# Patient Record
Sex: FEMALE | Race: BLACK OR AFRICAN AMERICAN | NOT HISPANIC OR LATINO | Employment: OTHER | URBAN - METROPOLITAN AREA
[De-identification: names, ages, dates, MRNs, and addresses within clinical notes are randomized per-mention and may not be internally consistent; named-entity substitution may affect disease eponyms.]

---

## 2019-08-03 ENCOUNTER — HOSPITAL ENCOUNTER (EMERGENCY)
Facility: HOSPITAL | Age: 64
Discharge: HOME | End: 2019-08-03
Attending: EMERGENCY MEDICINE
Payer: COMMERCIAL

## 2019-08-03 VITALS
HEIGHT: 60 IN | RESPIRATION RATE: 18 BRPM | SYSTOLIC BLOOD PRESSURE: 132 MMHG | HEART RATE: 89 BPM | BODY MASS INDEX: 31.41 KG/M2 | DIASTOLIC BLOOD PRESSURE: 71 MMHG | OXYGEN SATURATION: 98 % | TEMPERATURE: 98 F | WEIGHT: 160 LBS

## 2019-08-03 DIAGNOSIS — S61.210A LACERATION OF RIGHT INDEX FINGER WITHOUT FOREIGN BODY WITHOUT DAMAGE TO NAIL, INITIAL ENCOUNTER: Primary | ICD-10-CM

## 2019-08-03 PROCEDURE — 99284 EMERGENCY DEPT VISIT MOD MDM: CPT | Mod: 25

## 2019-08-03 PROCEDURE — 12001 RPR S/N/AX/GEN/TRNK 2.5CM/<: CPT

## 2019-08-03 PROCEDURE — 25000003 PHARM REV CODE 250: Performed by: PHYSICIAN ASSISTANT

## 2019-08-03 RX ORDER — ASPIRIN 81 MG/1
81 TABLET ORAL DAILY
COMMUNITY

## 2019-08-03 RX ORDER — LIDOCAINE HYDROCHLORIDE 10 MG/ML
10 INJECTION INFILTRATION; PERINEURAL
Status: COMPLETED | OUTPATIENT
Start: 2019-08-03 | End: 2019-08-03

## 2019-08-03 RX ORDER — CARVEDILOL 12.5 MG/1
12.5 TABLET ORAL 2 TIMES DAILY WITH MEALS
Status: ON HOLD | COMMUNITY
End: 2021-11-16 | Stop reason: HOSPADM

## 2019-08-03 RX ORDER — CEPHALEXIN 500 MG/1
500 CAPSULE ORAL 3 TIMES DAILY
Qty: 9 CAPSULE | Refills: 0 | Status: SHIPPED | OUTPATIENT
Start: 2019-08-03 | End: 2019-08-06

## 2019-08-03 RX ORDER — LISINOPRIL 10 MG/1
20 TABLET ORAL DAILY
COMMUNITY

## 2019-08-03 RX ORDER — BACITRACIN ZINC 500 UNIT/G
OINTMENT (GRAM) TOPICAL 2 TIMES DAILY
Qty: 14 G | Refills: 0 | Status: ON HOLD | OUTPATIENT
Start: 2019-08-03 | End: 2021-11-16 | Stop reason: HOSPADM

## 2019-08-03 RX ADMIN — NEOMYCIN AND POLYMYXIN B SULFATES AND BACITRACIN ZINC: 400; 3.5; 5 OINTMENT TOPICAL at 06:08

## 2019-08-03 RX ADMIN — LIDOCAINE HYDROCHLORIDE 10 ML: 10 INJECTION, SOLUTION INFILTRATION; PERINEURAL at 06:08

## 2019-08-03 NOTE — ED PROVIDER NOTES
Encounter Date: 8/3/2019       History     Chief Complaint   Patient presents with    Laceration     pt reports cutting right index finger on oven 30 min PTA. reports tetanus shot is up to date.      62yo F with pmh HTN with chief complaint R hand 2nd digit laceration x 1 hour pta. Pt cut hand while cleaning oven. Immediately applied pressure to wound. She is L handed. No other complaints. No uncontrolled bleeding. No suspicion for FB. Symptoms acute, constant, severity 5/10. Tetanus UTD per patient.        Review of patient's allergies indicates:  No Known Allergies  Past Medical History:   Diagnosis Date    Hypertension      History reviewed. No pertinent surgical history.  History reviewed. No pertinent family history.  Social History     Tobacco Use    Smoking status: Never Smoker    Smokeless tobacco: Never Used   Substance Use Topics    Alcohol use: Never     Frequency: Never    Drug use: Never     Review of Systems   Constitutional: Negative for chills and fever.   HENT: Negative for sore throat.    Eyes: Negative.  Negative for discharge and redness.   Respiratory: Negative for shortness of breath.    Cardiovascular: Negative for chest pain.   Gastrointestinal: Negative for abdominal pain, nausea and vomiting.   Endocrine: Negative.    Genitourinary: Negative for dysuria.   Musculoskeletal: Negative for back pain, neck pain and neck stiffness.   Skin: Positive for wound. Negative for rash.   Neurological: Negative for weakness and headaches.   Hematological: Does not bruise/bleed easily.   Psychiatric/Behavioral: Negative.    All other systems reviewed and are negative.      Physical Exam     Initial Vitals [08/03/19 1746]   BP Pulse Resp Temp SpO2   132/86 106 18 98.5 °F (36.9 °C) 98 %      MAP       --         Physical Exam    Nursing note and vitals reviewed.  Constitutional: She appears well-developed and well-nourished. She is not diaphoretic. No distress.   Well-appearing, nontoxic. Resting  comfortably on exam table.   HENT:   Head: Normocephalic and atraumatic.   Eyes: Conjunctivae and EOM are normal. Pupils are equal, round, and reactive to light.   Neck: Normal range of motion. Neck supple. No tracheal deviation present.   Cardiovascular: Intact distal pulses.   Pulmonary/Chest: No stridor. No respiratory distress.   Abdominal: There is no tenderness.   Musculoskeletal: She exhibits no tenderness.   R hand 2nd digit ulnar aspect proximal phalanx with 1cm laceration, entering dermis. No vascular injury or uncontrolled bleeding. No bony deformity. Cap refill wnl all digits.   Lymphadenopathy:     She has no cervical adenopathy.   Neurological: She is alert and oriented to person, place, and time. GCS score is 15. GCS eye subscore is 4. GCS verbal subscore is 5. GCS motor subscore is 6.   Skin: Skin is warm and dry. Capillary refill takes less than 2 seconds.   Psychiatric: She has a normal mood and affect. Her behavior is normal. Judgment and thought content normal.         ED Course   Lac Repair  Date/Time: 8/3/2019 7:36 PM  Performed by: Shelton Nelson PA-C  Authorized by: Sushil Ware MD   Body area: upper extremity  Location details: right index finger  Laceration length: 1 cm  Foreign bodies: no foreign bodies  Tendon involvement: none  Nerve involvement: none  Vascular damage: no  Anesthesia: digital block    Anesthesia:  Local Anesthetic: lidocaine 1% without epinephrine  Anesthetic total: 2 mL  Patient sedated: no  Preparation: Patient was prepped and draped in the usual sterile fashion.  Irrigation solution: saline  Irrigation method: syringe  Amount of cleaning: standard  Debridement: minimal  Degree of undermining: none  Skin closure: 4-0 nylon  Number of sutures: 2  Technique: simple  Approximation: close  Approximation difficulty: simple  Dressing: antibiotic ointment and splint for protection  Patient tolerance: Patient tolerated the procedure well with no immediate  complications        Labs Reviewed - No data to display       Imaging Results    None          Medical Decision Making:   Differential Diagnosis:   Open fracture, laceration, infected wound  ED Management:  No suspicion for open fracture.  Laceration repaired without issue.  Remains neurovascularly intact. PCP follow-up.  Return precautions given.                      Clinical Impression:       ICD-10-CM ICD-9-CM   1. Laceration of right index finger without foreign body without damage to nail, initial encounter S61.210A 883.0         Disposition:   Disposition: Discharged  Condition: Stable                        Shelton Nelson PA-C  08/03/19 1937

## 2019-08-03 NOTE — ED TRIAGE NOTES
"Pt reports "I was cleaning my son's oven and the hard edge rubber portion cut my finger."  Pt present with laceration to right hand 2nd digit.  Pt say it happen about 45 minutes ago and she preceded immediately to ER.    "

## 2019-08-04 NOTE — DISCHARGE INSTRUCTIONS
Keep dressing in place for 24 hr, do not get wet.  After that, change dressings twice daily; apply antibiotic ointment twice daily with each dressing change.  Keep finger splint in place when you are out and about.  Sutures need to be removed in 10-12 days.  Follow-up with your primary care provider next week for suture removal, wound re-evaluation.  Please return to this ED if wound becomes red and warm, if you experience increasing pain, if wound begins to drain foul-smelling fluid, if you begin with fever, if any other problems occur.

## 2021-11-14 ENCOUNTER — HOSPITAL ENCOUNTER (INPATIENT)
Facility: HOSPITAL | Age: 66
LOS: 2 days | Discharge: HOME | DRG: 286 | End: 2021-11-16
Attending: EMERGENCY MEDICINE | Admitting: STUDENT IN AN ORGANIZED HEALTH CARE EDUCATION/TRAINING PROGRAM
Payer: MEDICARE

## 2021-11-14 DIAGNOSIS — I25.10 CAD (CORONARY ARTERY DISEASE): ICD-10-CM

## 2021-11-14 DIAGNOSIS — R00.0 SINUS TACHYCARDIA: ICD-10-CM

## 2021-11-14 DIAGNOSIS — I50.9 ACUTE ON CHRONIC CONGESTIVE HEART FAILURE, UNSPECIFIED HEART FAILURE TYPE: ICD-10-CM

## 2021-11-14 DIAGNOSIS — I10 HYPERTENSION, UNSPECIFIED TYPE: ICD-10-CM

## 2021-11-14 DIAGNOSIS — R06.02 SOB (SHORTNESS OF BREATH): ICD-10-CM

## 2021-11-14 DIAGNOSIS — I50.9 CHF (CONGESTIVE HEART FAILURE): ICD-10-CM

## 2021-11-14 DIAGNOSIS — I50.23 ACUTE ON CHRONIC SYSTOLIC (CONGESTIVE) HEART FAILURE: ICD-10-CM

## 2021-11-14 DIAGNOSIS — J81.0 ACUTE PULMONARY EDEMA: Primary | ICD-10-CM

## 2021-11-14 DIAGNOSIS — J90 PLEURAL EFFUSION, BILATERAL: ICD-10-CM

## 2021-11-14 PROBLEM — J81.1 PULMONARY EDEMA: Status: ACTIVE | Noted: 2021-11-14

## 2021-11-14 LAB
ALBUMIN SERPL BCP-MCNC: 3.5 G/DL (ref 3.5–5.2)
ALP SERPL-CCNC: 76 U/L (ref 55–135)
ALT SERPL W/O P-5'-P-CCNC: 28 U/L (ref 10–44)
ANION GAP SERPL CALC-SCNC: 13 MMOL/L (ref 8–16)
AST SERPL-CCNC: 21 U/L (ref 10–40)
BASOPHILS # BLD AUTO: 0.03 K/UL (ref 0–0.2)
BASOPHILS NFR BLD: 0.4 % (ref 0–1.9)
BILIRUB SERPL-MCNC: 1.5 MG/DL (ref 0.1–1)
BILIRUB UR QL STRIP: NEGATIVE
BNP SERPL-MCNC: 899 PG/ML (ref 0–99)
BUN SERPL-MCNC: 18 MG/DL (ref 8–23)
CALCIUM SERPL-MCNC: 9.9 MG/DL (ref 8.7–10.5)
CHLORIDE SERPL-SCNC: 106 MMOL/L (ref 95–110)
CLARITY UR: CLEAR
CO2 SERPL-SCNC: 21 MMOL/L (ref 23–29)
COLOR UR: COLORLESS
CREAT SERPL-MCNC: 1.2 MG/DL (ref 0.5–1.4)
CTP QC/QA: YES
D DIMER PPP IA.FEU-MCNC: 2.92 MG/L FEU
DIFFERENTIAL METHOD: ABNORMAL
EOSINOPHIL # BLD AUTO: 0.1 K/UL (ref 0–0.5)
EOSINOPHIL NFR BLD: 1.2 % (ref 0–8)
ERYTHROCYTE [DISTWIDTH] IN BLOOD BY AUTOMATED COUNT: 13.6 % (ref 11.5–14.5)
EST. GFR  (AFRICAN AMERICAN): 54 ML/MIN/1.73 M^2
EST. GFR  (NON AFRICAN AMERICAN): 47 ML/MIN/1.73 M^2
GLUCOSE SERPL-MCNC: 79 MG/DL (ref 70–110)
GLUCOSE UR QL STRIP: NEGATIVE
HCT VFR BLD AUTO: 37.9 % (ref 37–48.5)
HGB BLD-MCNC: 12.2 G/DL (ref 12–16)
HGB UR QL STRIP: NEGATIVE
IMM GRANULOCYTES # BLD AUTO: 0.02 K/UL (ref 0–0.04)
IMM GRANULOCYTES NFR BLD AUTO: 0.3 % (ref 0–0.5)
INR PPP: 1.1 (ref 0.8–1.2)
KETONES UR QL STRIP: NEGATIVE
LEUKOCYTE ESTERASE UR QL STRIP: NEGATIVE
LYMPHOCYTES # BLD AUTO: 1.2 K/UL (ref 1–4.8)
LYMPHOCYTES NFR BLD: 17.6 % (ref 18–48)
MAGNESIUM SERPL-MCNC: 1.7 MG/DL (ref 1.6–2.6)
MCH RBC QN AUTO: 27 PG (ref 27–31)
MCHC RBC AUTO-ENTMCNC: 32.2 G/DL (ref 32–36)
MCV RBC AUTO: 84 FL (ref 82–98)
MONOCYTES # BLD AUTO: 0.3 K/UL (ref 0.3–1)
MONOCYTES NFR BLD: 4.8 % (ref 4–15)
NEUTROPHILS # BLD AUTO: 5.1 K/UL (ref 1.8–7.7)
NEUTROPHILS NFR BLD: 75.7 % (ref 38–73)
NITRITE UR QL STRIP: NEGATIVE
NRBC BLD-RTO: 0 /100 WBC
PH UR STRIP: 7 [PH] (ref 5–8)
PLATELET # BLD AUTO: 256 K/UL (ref 150–450)
PMV BLD AUTO: 11.7 FL (ref 9.2–12.9)
POTASSIUM SERPL-SCNC: 4 MMOL/L (ref 3.5–5.1)
PROT SERPL-MCNC: 7.3 G/DL (ref 6–8.4)
PROT UR QL STRIP: NEGATIVE
PROTHROMBIN TIME: 11.4 SEC (ref 9–12.5)
RBC # BLD AUTO: 4.52 M/UL (ref 4–5.4)
SARS-COV-2 RDRP RESP QL NAA+PROBE: NEGATIVE
SODIUM SERPL-SCNC: 140 MMOL/L (ref 136–145)
SP GR UR STRIP: 1.01 (ref 1–1.03)
TROPONIN I SERPL DL<=0.01 NG/ML-MCNC: 0.01 NG/ML (ref 0–0.03)
URN SPEC COLLECT METH UR: ABNORMAL
UROBILINOGEN UR STRIP-ACNC: NEGATIVE EU/DL
WBC # BLD AUTO: 6.7 K/UL (ref 3.9–12.7)

## 2021-11-14 PROCEDURE — 93010 EKG 12-LEAD: ICD-10-PCS | Mod: ,,, | Performed by: INTERNAL MEDICINE

## 2021-11-14 PROCEDURE — U0002 COVID-19 LAB TEST NON-CDC: HCPCS | Performed by: EMERGENCY MEDICINE

## 2021-11-14 PROCEDURE — 85610 PROTHROMBIN TIME: CPT | Performed by: EMERGENCY MEDICINE

## 2021-11-14 PROCEDURE — 81003 URINALYSIS AUTO W/O SCOPE: CPT | Performed by: PHYSICIAN ASSISTANT

## 2021-11-14 PROCEDURE — 93005 ELECTROCARDIOGRAM TRACING: CPT

## 2021-11-14 PROCEDURE — 93010 ELECTROCARDIOGRAM REPORT: CPT | Mod: ,,, | Performed by: INTERNAL MEDICINE

## 2021-11-14 PROCEDURE — 25500020 PHARM REV CODE 255: Performed by: EMERGENCY MEDICINE

## 2021-11-14 PROCEDURE — 84484 ASSAY OF TROPONIN QUANT: CPT | Performed by: EMERGENCY MEDICINE

## 2021-11-14 PROCEDURE — 83036 HEMOGLOBIN GLYCOSYLATED A1C: CPT | Performed by: EMERGENCY MEDICINE

## 2021-11-14 PROCEDURE — 80053 COMPREHEN METABOLIC PANEL: CPT | Performed by: EMERGENCY MEDICINE

## 2021-11-14 PROCEDURE — 99285 EMERGENCY DEPT VISIT HI MDM: CPT | Mod: 25

## 2021-11-14 PROCEDURE — 63600175 PHARM REV CODE 636 W HCPCS: Performed by: PHYSICIAN ASSISTANT

## 2021-11-14 PROCEDURE — 85379 FIBRIN DEGRADATION QUANT: CPT | Performed by: EMERGENCY MEDICINE

## 2021-11-14 PROCEDURE — 63600175 PHARM REV CODE 636 W HCPCS: Performed by: EMERGENCY MEDICINE

## 2021-11-14 PROCEDURE — 83880 ASSAY OF NATRIURETIC PEPTIDE: CPT | Performed by: EMERGENCY MEDICINE

## 2021-11-14 PROCEDURE — 83735 ASSAY OF MAGNESIUM: CPT | Performed by: EMERGENCY MEDICINE

## 2021-11-14 PROCEDURE — 11000001 HC ACUTE MED/SURG PRIVATE ROOM

## 2021-11-14 PROCEDURE — 25000003 PHARM REV CODE 250: Performed by: PHYSICIAN ASSISTANT

## 2021-11-14 PROCEDURE — 85025 COMPLETE CBC W/AUTO DIFF WBC: CPT | Performed by: EMERGENCY MEDICINE

## 2021-11-14 RX ORDER — AMOXICILLIN 250 MG
1 CAPSULE ORAL DAILY PRN
Status: DISCONTINUED | OUTPATIENT
Start: 2021-11-14 | End: 2021-11-16 | Stop reason: HOSPADM

## 2021-11-14 RX ORDER — FUROSEMIDE 10 MG/ML
40 INJECTION INTRAMUSCULAR; INTRAVENOUS
Status: DISCONTINUED | OUTPATIENT
Start: 2021-11-14 | End: 2021-11-14

## 2021-11-14 RX ORDER — ASPIRIN 81 MG/1
81 TABLET ORAL DAILY
Status: DISCONTINUED | OUTPATIENT
Start: 2021-11-15 | End: 2021-11-16 | Stop reason: HOSPADM

## 2021-11-14 RX ORDER — CARVEDILOL 12.5 MG/1
12.5 TABLET ORAL 2 TIMES DAILY WITH MEALS
Status: DISCONTINUED | OUTPATIENT
Start: 2021-11-14 | End: 2021-11-15

## 2021-11-14 RX ORDER — SODIUM CHLORIDE 0.9 % (FLUSH) 0.9 %
10 SYRINGE (ML) INJECTION
Status: DISCONTINUED | OUTPATIENT
Start: 2021-11-14 | End: 2021-11-16 | Stop reason: HOSPADM

## 2021-11-14 RX ORDER — ACETAMINOPHEN 500 MG
500 TABLET ORAL EVERY 6 HOURS PRN
Status: DISCONTINUED | OUTPATIENT
Start: 2021-11-14 | End: 2021-11-16

## 2021-11-14 RX ORDER — TALC
6 POWDER (GRAM) TOPICAL NIGHTLY PRN
Status: DISCONTINUED | OUTPATIENT
Start: 2021-11-14 | End: 2021-11-16 | Stop reason: HOSPADM

## 2021-11-14 RX ORDER — FUROSEMIDE 10 MG/ML
80 INJECTION INTRAMUSCULAR; INTRAVENOUS
Status: COMPLETED | OUTPATIENT
Start: 2021-11-14 | End: 2021-11-14

## 2021-11-14 RX ORDER — FUROSEMIDE 10 MG/ML
40 INJECTION INTRAMUSCULAR; INTRAVENOUS
Status: DISCONTINUED | OUTPATIENT
Start: 2021-11-14 | End: 2021-11-15

## 2021-11-14 RX ADMIN — FUROSEMIDE 40 MG: 10 INJECTION, SOLUTION INTRAMUSCULAR; INTRAVENOUS at 08:11

## 2021-11-14 RX ADMIN — CARVEDILOL 12.5 MG: 12.5 TABLET, FILM COATED ORAL at 04:11

## 2021-11-14 RX ADMIN — FUROSEMIDE 80 MG: 10 INJECTION, SOLUTION INTRAMUSCULAR; INTRAVENOUS at 03:11

## 2021-11-14 RX ADMIN — IOHEXOL 75 ML: 350 INJECTION, SOLUTION INTRAVENOUS at 02:11

## 2021-11-15 LAB
ANION GAP SERPL CALC-SCNC: 14 MMOL/L (ref 8–16)
ANION GAP SERPL CALC-SCNC: 15 MMOL/L (ref 8–16)
AORTIC ROOT ANNULUS: 2.6 CM
AORTIC VALVE CUSP SEPERATION: 1.63 CM
ASCENDING AORTA: 2.45 CM
AV INDEX (PROSTH): 0.51
AV MEAN GRADIENT: 4 MMHG
AV PEAK GRADIENT: 6 MMHG
AV VALVE AREA: 1.42 CM2
AV VELOCITY RATIO: 0.56
BASOPHILS # BLD AUTO: 0.03 K/UL (ref 0–0.2)
BASOPHILS NFR BLD: 0.3 % (ref 0–1.9)
BSA FOR ECHO PROCEDURE: 1.81 M2
BUN SERPL-MCNC: 23 MG/DL (ref 8–23)
BUN SERPL-MCNC: 23 MG/DL (ref 8–23)
CALCIUM SERPL-MCNC: 10.2 MG/DL (ref 8.7–10.5)
CALCIUM SERPL-MCNC: 9.9 MG/DL (ref 8.7–10.5)
CHLORIDE SERPL-SCNC: 100 MMOL/L (ref 95–110)
CHLORIDE SERPL-SCNC: 100 MMOL/L (ref 95–110)
CHOLEST SERPL-MCNC: 156 MG/DL (ref 120–199)
CHOLEST/HDLC SERPL: 4.2 {RATIO} (ref 2–5)
CO2 SERPL-SCNC: 25 MMOL/L (ref 23–29)
CO2 SERPL-SCNC: 26 MMOL/L (ref 23–29)
CREAT SERPL-MCNC: 1.3 MG/DL (ref 0.5–1.4)
CREAT SERPL-MCNC: 1.4 MG/DL (ref 0.5–1.4)
CV ECHO LV RWT: 0.2 CM
DIFFERENTIAL METHOD: ABNORMAL
DOP CALC AO PEAK VEL: 1.24 M/S
DOP CALC AO VTI: 20.55 CM
DOP CALC LVOT AREA: 2.8 CM2
DOP CALC LVOT DIAMETER: 1.88 CM
DOP CALC LVOT PEAK VEL: 0.69 M/S
DOP CALC LVOT STROKE VOLUME: 29.19 CM3
DOP CALCLVOT PEAK VEL VTI: 10.52 CM
E WAVE DECELERATION TIME: 127.91 MSEC
E/A RATIO: 0.85
ECHO LV POSTERIOR WALL: 0.7 CM (ref 0.6–1.1)
EJECTION FRACTION: 10 %
EOSINOPHIL # BLD AUTO: 0 K/UL (ref 0–0.5)
EOSINOPHIL NFR BLD: 0.2 % (ref 0–8)
ERYTHROCYTE [DISTWIDTH] IN BLOOD BY AUTOMATED COUNT: 13.8 % (ref 11.5–14.5)
EST. GFR  (AFRICAN AMERICAN): 45 ML/MIN/1.73 M^2
EST. GFR  (AFRICAN AMERICAN): 49 ML/MIN/1.73 M^2
EST. GFR  (NON AFRICAN AMERICAN): 39 ML/MIN/1.73 M^2
EST. GFR  (NON AFRICAN AMERICAN): 43 ML/MIN/1.73 M^2
ESTIMATED AVG GLUCOSE: 111 MG/DL (ref 68–131)
FRACTIONAL SHORTENING: 5 % (ref 28–44)
GLUCOSE SERPL-MCNC: 108 MG/DL (ref 70–110)
GLUCOSE SERPL-MCNC: 91 MG/DL (ref 70–110)
HBA1C MFR BLD: 5.5 % (ref 4–5.6)
HCT VFR BLD AUTO: 39.7 % (ref 37–48.5)
HDLC SERPL-MCNC: 37 MG/DL (ref 40–75)
HDLC SERPL: 23.7 % (ref 20–50)
HGB BLD-MCNC: 13 G/DL (ref 12–16)
IMM GRANULOCYTES # BLD AUTO: 0.02 K/UL (ref 0–0.04)
IMM GRANULOCYTES NFR BLD AUTO: 0.2 % (ref 0–0.5)
INR PPP: 1.1 (ref 0.8–1.2)
INTERVENTRICULAR SEPTUM: 0.68 CM (ref 0.6–1.1)
IVRT: 114.19 MSEC
LA MAJOR: 5.28 CM
LA MINOR: 5.05 CM
LA WIDTH: 4.95 CM
LDLC SERPL CALC-MCNC: 97.4 MG/DL (ref 63–159)
LEFT ATRIUM SIZE: 4.1 CM
LEFT ATRIUM VOLUME INDEX: 49.2 ML/M2
LEFT ATRIUM VOLUME: 89.06 CM3
LEFT INTERNAL DIMENSION IN SYSTOLE: 6.7 CM (ref 2.1–4)
LEFT VENTRICLE DIASTOLIC VOLUME INDEX: 142.32 ML/M2
LEFT VENTRICLE DIASTOLIC VOLUME: 257.6 ML
LEFT VENTRICLE MASS INDEX: 114 G/M2
LEFT VENTRICLE SYSTOLIC VOLUME INDEX: 127.7 ML/M2
LEFT VENTRICLE SYSTOLIC VOLUME: 231.09 ML
LEFT VENTRICULAR INTERNAL DIMENSION IN DIASTOLE: 7.03 CM (ref 3.5–6)
LEFT VENTRICULAR MASS: 206.43 G
LV SEPTAL E/E' RATIO: 39.5 M/S
LYMPHOCYTES # BLD AUTO: 1 K/UL (ref 1–4.8)
LYMPHOCYTES NFR BLD: 10.8 % (ref 18–48)
MAGNESIUM SERPL-MCNC: 1.9 MG/DL (ref 1.6–2.6)
MCH RBC QN AUTO: 27.4 PG (ref 27–31)
MCHC RBC AUTO-ENTMCNC: 32.7 G/DL (ref 32–36)
MCV RBC AUTO: 84 FL (ref 82–98)
MONOCYTES # BLD AUTO: 0.5 K/UL (ref 0.3–1)
MONOCYTES NFR BLD: 5.8 % (ref 4–15)
MV PEAK A VEL: 0.93 M/S
MV PEAK E VEL: 0.79 M/S
NEUTROPHILS # BLD AUTO: 7.6 K/UL (ref 1.8–7.7)
NEUTROPHILS NFR BLD: 82.7 % (ref 38–73)
NONHDLC SERPL-MCNC: 119 MG/DL
NRBC BLD-RTO: 0 /100 WBC
PISA TR MAX VEL: 2.39 M/S
PLATELET # BLD AUTO: 296 K/UL (ref 150–450)
PMV BLD AUTO: 11.7 FL (ref 9.2–12.9)
POTASSIUM SERPL-SCNC: 4.1 MMOL/L (ref 3.5–5.1)
POTASSIUM SERPL-SCNC: 4.1 MMOL/L (ref 3.5–5.1)
PROTHROMBIN TIME: 11.7 SEC (ref 9–12.5)
PV PEAK VELOCITY: 0.88 CM/S
RA MAJOR: 4.55 CM
RA PRESSURE: 3 MMHG
RA WIDTH: 3.95 CM
RBC # BLD AUTO: 4.74 M/UL (ref 4–5.4)
RIGHT VENTRICULAR END-DIASTOLIC DIMENSION: 3.32 CM
RV TISSUE DOPPLER FREE WALL SYSTOLIC VELOCITY 1 (APICAL 4 CHAMBER VIEW): 7.38 CM/S
SINUS: 2.76 CM
SODIUM SERPL-SCNC: 140 MMOL/L (ref 136–145)
SODIUM SERPL-SCNC: 140 MMOL/L (ref 136–145)
STJ: 2.16 CM
TDI SEPTAL: 0.02 M/S
TR MAX PG: 23 MMHG
TRICUSPID ANNULAR PLANE SYSTOLIC EXCURSION: 2.01 CM
TRIGL SERPL-MCNC: 108 MG/DL (ref 30–150)
TSH SERPL DL<=0.005 MIU/L-ACNC: 1.45 UIU/ML (ref 0.4–4)
TV REST PULMONARY ARTERY PRESSURE: 26 MMHG
WBC # BLD AUTO: 9.25 K/UL (ref 3.9–12.7)

## 2021-11-15 PROCEDURE — 80048 BASIC METABOLIC PNL TOTAL CA: CPT | Mod: 91 | Performed by: PHYSICIAN ASSISTANT

## 2021-11-15 PROCEDURE — 25000003 PHARM REV CODE 250: Performed by: INTERNAL MEDICINE

## 2021-11-15 PROCEDURE — 85610 PROTHROMBIN TIME: CPT | Performed by: PHYSICIAN ASSISTANT

## 2021-11-15 PROCEDURE — 25000003 PHARM REV CODE 250: Performed by: PHYSICIAN ASSISTANT

## 2021-11-15 PROCEDURE — 36415 COLL VENOUS BLD VENIPUNCTURE: CPT | Performed by: STUDENT IN AN ORGANIZED HEALTH CARE EDUCATION/TRAINING PROGRAM

## 2021-11-15 PROCEDURE — 11000001 HC ACUTE MED/SURG PRIVATE ROOM

## 2021-11-15 PROCEDURE — 83735 ASSAY OF MAGNESIUM: CPT | Performed by: STUDENT IN AN ORGANIZED HEALTH CARE EDUCATION/TRAINING PROGRAM

## 2021-11-15 PROCEDURE — 36415 COLL VENOUS BLD VENIPUNCTURE: CPT | Performed by: PHYSICIAN ASSISTANT

## 2021-11-15 PROCEDURE — 99223 1ST HOSP IP/OBS HIGH 75: CPT | Mod: ,,, | Performed by: INTERNAL MEDICINE

## 2021-11-15 PROCEDURE — 25000003 PHARM REV CODE 250: Performed by: STUDENT IN AN ORGANIZED HEALTH CARE EDUCATION/TRAINING PROGRAM

## 2021-11-15 PROCEDURE — 63600175 PHARM REV CODE 636 W HCPCS: Performed by: INTERNAL MEDICINE

## 2021-11-15 PROCEDURE — 85025 COMPLETE CBC W/AUTO DIFF WBC: CPT | Performed by: PHYSICIAN ASSISTANT

## 2021-11-15 PROCEDURE — 80048 BASIC METABOLIC PNL TOTAL CA: CPT | Performed by: STUDENT IN AN ORGANIZED HEALTH CARE EDUCATION/TRAINING PROGRAM

## 2021-11-15 PROCEDURE — 99223 PR INITIAL HOSPITAL CARE,LEVL III: ICD-10-PCS | Mod: ,,, | Performed by: INTERNAL MEDICINE

## 2021-11-15 PROCEDURE — 80061 LIPID PANEL: CPT | Performed by: PHYSICIAN ASSISTANT

## 2021-11-15 PROCEDURE — 84443 ASSAY THYROID STIM HORMONE: CPT | Performed by: PHYSICIAN ASSISTANT

## 2021-11-15 RX ORDER — MAGNESIUM SULFATE 1 G/100ML
1 INJECTION INTRAVENOUS ONCE
Status: COMPLETED | OUTPATIENT
Start: 2021-11-15 | End: 2021-11-15

## 2021-11-15 RX ORDER — CLOPIDOGREL BISULFATE 75 MG/1
75 TABLET ORAL DAILY
Status: DISCONTINUED | OUTPATIENT
Start: 2021-11-16 | End: 2021-11-16 | Stop reason: HOSPADM

## 2021-11-15 RX ORDER — CLOPIDOGREL BISULFATE 75 MG/1
300 TABLET ORAL ONCE
Status: COMPLETED | OUTPATIENT
Start: 2021-11-15 | End: 2021-11-15

## 2021-11-15 RX ADMIN — METOPROLOL SUCCINATE 12.5 MG: 25 TABLET, EXTENDED RELEASE ORAL at 12:11

## 2021-11-15 RX ADMIN — MAGNESIUM SULFATE 1 G: 1 INJECTION INTRAVENOUS at 04:11

## 2021-11-15 RX ADMIN — ASPIRIN 81 MG: 81 TABLET, COATED ORAL at 10:11

## 2021-11-15 RX ADMIN — CLOPIDOGREL 300 MG: 75 TABLET, FILM COATED ORAL at 10:11

## 2021-11-16 VITALS
TEMPERATURE: 98 F | WEIGHT: 187.19 LBS | HEART RATE: 96 BPM | OXYGEN SATURATION: 97 % | BODY MASS INDEX: 36.75 KG/M2 | DIASTOLIC BLOOD PRESSURE: 73 MMHG | SYSTOLIC BLOOD PRESSURE: 107 MMHG | RESPIRATION RATE: 18 BRPM | HEIGHT: 60 IN

## 2021-11-16 LAB
ANION GAP SERPL CALC-SCNC: 11 MMOL/L (ref 8–16)
BASOPHILS # BLD AUTO: 0.02 K/UL (ref 0–0.2)
BASOPHILS NFR BLD: 0.2 % (ref 0–1.9)
BUN SERPL-MCNC: 25 MG/DL (ref 8–23)
CALCIUM SERPL-MCNC: 9.2 MG/DL (ref 8.7–10.5)
CHLORIDE SERPL-SCNC: 102 MMOL/L (ref 95–110)
CO2 SERPL-SCNC: 24 MMOL/L (ref 23–29)
CREAT SERPL-MCNC: 1.2 MG/DL (ref 0.5–1.4)
DIFFERENTIAL METHOD: ABNORMAL
EOSINOPHIL # BLD AUTO: 0.2 K/UL (ref 0–0.5)
EOSINOPHIL NFR BLD: 1.6 % (ref 0–8)
ERYTHROCYTE [DISTWIDTH] IN BLOOD BY AUTOMATED COUNT: 14.1 % (ref 11.5–14.5)
EST. GFR  (AFRICAN AMERICAN): 54 ML/MIN/1.73 M^2
EST. GFR  (NON AFRICAN AMERICAN): 47 ML/MIN/1.73 M^2
GLUCOSE SERPL-MCNC: 97 MG/DL (ref 70–110)
HCT VFR BLD AUTO: 41.7 % (ref 37–48.5)
HGB BLD-MCNC: 13.2 G/DL (ref 12–16)
IMM GRANULOCYTES # BLD AUTO: 0.02 K/UL (ref 0–0.04)
IMM GRANULOCYTES NFR BLD AUTO: 0.2 % (ref 0–0.5)
LYMPHOCYTES # BLD AUTO: 1.4 K/UL (ref 1–4.8)
LYMPHOCYTES NFR BLD: 13.9 % (ref 18–48)
MAGNESIUM SERPL-MCNC: 2.1 MG/DL (ref 1.6–2.6)
MCH RBC QN AUTO: 27.2 PG (ref 27–31)
MCHC RBC AUTO-ENTMCNC: 31.7 G/DL (ref 32–36)
MCV RBC AUTO: 86 FL (ref 82–98)
MONOCYTES # BLD AUTO: 0.7 K/UL (ref 0.3–1)
MONOCYTES NFR BLD: 7.2 % (ref 4–15)
NEUTROPHILS # BLD AUTO: 7.5 K/UL (ref 1.8–7.7)
NEUTROPHILS NFR BLD: 76.9 % (ref 38–73)
NRBC BLD-RTO: 0 /100 WBC
PLATELET # BLD AUTO: 280 K/UL (ref 150–450)
PMV BLD AUTO: 12 FL (ref 9.2–12.9)
POTASSIUM SERPL-SCNC: 3.3 MMOL/L (ref 3.5–5.1)
RBC # BLD AUTO: 4.86 M/UL (ref 4–5.4)
SODIUM SERPL-SCNC: 137 MMOL/L (ref 136–145)
WBC # BLD AUTO: 9.71 K/UL (ref 3.9–12.7)

## 2021-11-16 PROCEDURE — 80048 BASIC METABOLIC PNL TOTAL CA: CPT | Performed by: PHYSICIAN ASSISTANT

## 2021-11-16 PROCEDURE — 85025 COMPLETE CBC W/AUTO DIFF WBC: CPT | Performed by: INTERNAL MEDICINE

## 2021-11-16 PROCEDURE — 25500020 PHARM REV CODE 255: Performed by: INTERNAL MEDICINE

## 2021-11-16 PROCEDURE — 93458 L HRT ARTERY/VENTRICLE ANGIO: CPT | Mod: 26,,, | Performed by: INTERNAL MEDICINE

## 2021-11-16 PROCEDURE — C1769 GUIDE WIRE: HCPCS | Performed by: INTERNAL MEDICINE

## 2021-11-16 PROCEDURE — C1894 INTRO/SHEATH, NON-LASER: HCPCS | Performed by: INTERNAL MEDICINE

## 2021-11-16 PROCEDURE — 63600175 PHARM REV CODE 636 W HCPCS: Performed by: INTERNAL MEDICINE

## 2021-11-16 PROCEDURE — 25000003 PHARM REV CODE 250: Performed by: INTERNAL MEDICINE

## 2021-11-16 PROCEDURE — 99152 PR MOD CONSCIOUS SEDATION, SAME PHYS, 5+ YRS, FIRST 15 MIN: ICD-10-PCS | Mod: ,,, | Performed by: INTERNAL MEDICINE

## 2021-11-16 PROCEDURE — C1887 CATHETER, GUIDING: HCPCS | Performed by: INTERNAL MEDICINE

## 2021-11-16 PROCEDURE — 83735 ASSAY OF MAGNESIUM: CPT | Performed by: PHYSICIAN ASSISTANT

## 2021-11-16 PROCEDURE — 99152 MOD SED SAME PHYS/QHP 5/>YRS: CPT | Performed by: INTERNAL MEDICINE

## 2021-11-16 PROCEDURE — 93458 L HRT ARTERY/VENTRICLE ANGIO: CPT | Performed by: INTERNAL MEDICINE

## 2021-11-16 PROCEDURE — 36415 COLL VENOUS BLD VENIPUNCTURE: CPT | Performed by: INTERNAL MEDICINE

## 2021-11-16 PROCEDURE — 93458 PR CATH PLACE/CORON ANGIO, IMG SUPER/INTERP,W LEFT HEART VENTRICULOGRAPHY: ICD-10-PCS | Mod: 26,,, | Performed by: INTERNAL MEDICINE

## 2021-11-16 PROCEDURE — 36415 COLL VENOUS BLD VENIPUNCTURE: CPT | Performed by: PHYSICIAN ASSISTANT

## 2021-11-16 PROCEDURE — 99152 MOD SED SAME PHYS/QHP 5/>YRS: CPT | Mod: ,,, | Performed by: INTERNAL MEDICINE

## 2021-11-16 PROCEDURE — 99153 MOD SED SAME PHYS/QHP EA: CPT | Performed by: INTERNAL MEDICINE

## 2021-11-16 RX ORDER — ACETAMINOPHEN 325 MG/1
650 TABLET ORAL EVERY 4 HOURS PRN
Status: DISCONTINUED | OUTPATIENT
Start: 2021-11-16 | End: 2021-11-16 | Stop reason: HOSPADM

## 2021-11-16 RX ORDER — LIDOCAINE HYDROCHLORIDE 10 MG/ML
INJECTION, SOLUTION EPIDURAL; INFILTRATION; INTRACAUDAL; PERINEURAL
Status: DISCONTINUED | OUTPATIENT
Start: 2021-11-16 | End: 2021-11-16 | Stop reason: HOSPADM

## 2021-11-16 RX ORDER — VERAPAMIL HYDROCHLORIDE 2.5 MG/ML
INJECTION, SOLUTION INTRAVENOUS
Status: DISCONTINUED | OUTPATIENT
Start: 2021-11-16 | End: 2021-11-16 | Stop reason: HOSPADM

## 2021-11-16 RX ORDER — FUROSEMIDE 20 MG/1
20 TABLET ORAL DAILY
Qty: 90 TABLET | Refills: 0 | Status: SHIPPED | OUTPATIENT
Start: 2021-11-16 | End: 2022-02-14

## 2021-11-16 RX ORDER — NITROGLYCERIN 20 MG/100ML
INJECTION INTRAVENOUS
Status: DISCONTINUED | OUTPATIENT
Start: 2021-11-16 | End: 2021-11-16 | Stop reason: HOSPADM

## 2021-11-16 RX ORDER — OXYCODONE HYDROCHLORIDE 5 MG/1
5 TABLET ORAL EVERY 4 HOURS PRN
Status: DISCONTINUED | OUTPATIENT
Start: 2021-11-16 | End: 2021-11-16 | Stop reason: HOSPADM

## 2021-11-16 RX ORDER — DIPHENHYDRAMINE HCL 25 MG
50 CAPSULE ORAL ONCE
Status: DISCONTINUED | OUTPATIENT
Start: 2021-11-16 | End: 2021-11-16 | Stop reason: HOSPADM

## 2021-11-16 RX ORDER — POTASSIUM CHLORIDE 20 MEQ/1
40 TABLET, EXTENDED RELEASE ORAL ONCE
Status: DISCONTINUED | OUTPATIENT
Start: 2021-11-16 | End: 2021-11-16 | Stop reason: HOSPADM

## 2021-11-16 RX ORDER — IODIXANOL 320 MG/ML
INJECTION, SOLUTION INTRAVASCULAR
Status: DISCONTINUED | OUTPATIENT
Start: 2021-11-16 | End: 2021-11-16 | Stop reason: HOSPADM

## 2021-11-16 RX ORDER — MORPHINE SULFATE 4 MG/ML
3 INJECTION, SOLUTION INTRAMUSCULAR; INTRAVENOUS
Status: DISCONTINUED | OUTPATIENT
Start: 2021-11-16 | End: 2021-11-16 | Stop reason: HOSPADM

## 2021-11-16 RX ORDER — METOPROLOL SUCCINATE 25 MG/1
12.5 TABLET, EXTENDED RELEASE ORAL DAILY
Qty: 45 TABLET | Refills: 0 | Status: SHIPPED | OUTPATIENT
Start: 2021-11-17 | End: 2022-02-15

## 2021-11-16 RX ORDER — FENTANYL CITRATE 50 UG/ML
INJECTION, SOLUTION INTRAMUSCULAR; INTRAVENOUS
Status: DISCONTINUED | OUTPATIENT
Start: 2021-11-16 | End: 2021-11-16 | Stop reason: HOSPADM

## 2021-11-16 RX ORDER — SODIUM CHLORIDE 9 MG/ML
INJECTION, SOLUTION INTRAVENOUS CONTINUOUS
Status: DISCONTINUED | OUTPATIENT
Start: 2021-11-16 | End: 2021-11-16 | Stop reason: HOSPADM

## 2021-11-16 RX ORDER — MIDAZOLAM HYDROCHLORIDE 1 MG/ML
INJECTION, SOLUTION INTRAMUSCULAR; INTRAVENOUS
Status: DISCONTINUED | OUTPATIENT
Start: 2021-11-16 | End: 2021-11-16 | Stop reason: HOSPADM

## 2021-11-16 RX ORDER — ONDANSETRON 2 MG/ML
4 INJECTION INTRAMUSCULAR; INTRAVENOUS EVERY 12 HOURS PRN
Status: DISCONTINUED | OUTPATIENT
Start: 2021-11-16 | End: 2021-11-16 | Stop reason: HOSPADM

## 2022-01-09 PROCEDURE — 96376 TX/PRO/DX INJ SAME DRUG ADON: CPT

## 2022-01-09 PROCEDURE — 93005 ELECTROCARDIOGRAM TRACING: CPT

## 2022-01-09 PROCEDURE — 93010 EKG 12-LEAD: ICD-10-PCS | Mod: ,,, | Performed by: INTERNAL MEDICINE

## 2022-01-09 PROCEDURE — 96374 THER/PROPH/DIAG INJ IV PUSH: CPT

## 2022-01-09 PROCEDURE — 99285 EMERGENCY DEPT VISIT HI MDM: CPT | Mod: 25

## 2022-01-09 PROCEDURE — 93010 ELECTROCARDIOGRAM REPORT: CPT | Mod: ,,, | Performed by: INTERNAL MEDICINE

## 2022-01-09 PROCEDURE — 96372 THER/PROPH/DIAG INJ SC/IM: CPT | Mod: 59

## 2022-01-10 ENCOUNTER — HOSPITAL ENCOUNTER (OUTPATIENT)
Facility: HOSPITAL | Age: 67
Discharge: HOME | End: 2022-01-11
Attending: EMERGENCY MEDICINE | Admitting: STUDENT IN AN ORGANIZED HEALTH CARE EDUCATION/TRAINING PROGRAM
Payer: MEDICARE

## 2022-01-10 DIAGNOSIS — I50.9 CHF (CONGESTIVE HEART FAILURE): ICD-10-CM

## 2022-01-10 DIAGNOSIS — I50.9 ACUTE ON CHRONIC CONGESTIVE HEART FAILURE, UNSPECIFIED HEART FAILURE TYPE: Primary | ICD-10-CM

## 2022-01-10 DIAGNOSIS — I47.20 VENTRICULAR TACHYCARDIA: ICD-10-CM

## 2022-01-10 DIAGNOSIS — R06.02 SHORTNESS OF BREATH: ICD-10-CM

## 2022-01-10 DIAGNOSIS — J81.0 ACUTE PULMONARY EDEMA: ICD-10-CM

## 2022-01-10 DIAGNOSIS — R07.9 CHEST PAIN: ICD-10-CM

## 2022-01-10 PROBLEM — R19.7 DIARRHEA: Status: ACTIVE | Noted: 2022-01-10

## 2022-01-10 PROBLEM — R79.89 ELEVATED BRAIN NATRIURETIC PEPTIDE (BNP) LEVEL: Status: ACTIVE | Noted: 2022-01-10

## 2022-01-10 PROBLEM — E87.70 VOLUME OVERLOAD: Status: ACTIVE | Noted: 2022-01-10

## 2022-01-10 PROBLEM — I51.7 CARDIOMEGALY: Status: ACTIVE | Noted: 2022-01-10

## 2022-01-10 PROBLEM — I50.20 HFREF (HEART FAILURE WITH REDUCED EJECTION FRACTION): Status: ACTIVE | Noted: 2022-01-10

## 2022-01-10 LAB
ALBUMIN SERPL BCP-MCNC: 3.3 G/DL (ref 3.5–5.2)
ALP SERPL-CCNC: 113 U/L (ref 55–135)
ALT SERPL W/O P-5'-P-CCNC: 50 U/L (ref 10–44)
ANION GAP SERPL CALC-SCNC: 16 MMOL/L (ref 8–16)
AST SERPL-CCNC: 34 U/L (ref 10–40)
AV INDEX (PROSTH): 0.55
AV MEAN GRADIENT: 2 MMHG
AV PEAK GRADIENT: 3 MMHG
AV VALVE AREA: 0.96 CM2
AV VELOCITY RATIO: 0.58
BACTERIA #/AREA URNS HPF: ABNORMAL /HPF
BASOPHILS # BLD AUTO: 0.03 K/UL (ref 0–0.2)
BASOPHILS NFR BLD: 0.4 % (ref 0–1.9)
BILIRUB SERPL-MCNC: 1.9 MG/DL (ref 0.1–1)
BILIRUB UR QL STRIP: NEGATIVE
BNP SERPL-MCNC: 2309 PG/ML (ref 0–99)
BSA FOR ECHO PROCEDURE: 1.76 M2
BUN SERPL-MCNC: 29 MG/DL (ref 8–23)
CALCIUM SERPL-MCNC: 8.7 MG/DL (ref 8.7–10.5)
CHLORIDE SERPL-SCNC: 105 MMOL/L (ref 95–110)
CLARITY UR: CLEAR
CO2 SERPL-SCNC: 20 MMOL/L (ref 23–29)
COLOR UR: YELLOW
CREAT SERPL-MCNC: 1.4 MG/DL (ref 0.5–1.4)
CTP QC/QA: YES
CV ECHO LV RWT: 0.33 CM
D DIMER PPP IA.FEU-MCNC: 7.35 MG/L FEU
DIFFERENTIAL METHOD: ABNORMAL
DOP CALC AO PEAK VEL: 0.93 M/S
DOP CALC AO VTI: 12.25 CM
DOP CALC LVOT AREA: 1.7 CM2
DOP CALC LVOT DIAMETER: 1.49 CM
DOP CALC LVOT PEAK VEL: 0.54 M/S
DOP CALC LVOT STROKE VOLUME: 11.73 CM3
DOP CALCLVOT PEAK VEL VTI: 6.73 CM
E WAVE DECELERATION TIME: 132.98 MSEC
E/A RATIO: 2.16
E/E' RATIO: 21.2 M/S
ECHO LV POSTERIOR WALL: 1.05 CM (ref 0.6–1.1)
EJECTION FRACTION: 10 %
EOSINOPHIL # BLD AUTO: 0 K/UL (ref 0–0.5)
EOSINOPHIL NFR BLD: 0.4 % (ref 0–8)
ERYTHROCYTE [DISTWIDTH] IN BLOOD BY AUTOMATED COUNT: 15.8 % (ref 11.5–14.5)
EST. GFR  (AFRICAN AMERICAN): 45 ML/MIN/1.73 M^2
EST. GFR  (NON AFRICAN AMERICAN): 39 ML/MIN/1.73 M^2
FRACTIONAL SHORTENING: 5 % (ref 28–44)
GLUCOSE SERPL-MCNC: 103 MG/DL (ref 70–110)
GLUCOSE UR QL STRIP: NEGATIVE
HCT VFR BLD AUTO: 37.6 % (ref 37–48.5)
HGB BLD-MCNC: 11.7 G/DL (ref 12–16)
HGB UR QL STRIP: ABNORMAL
HYALINE CASTS #/AREA URNS LPF: 3 /LPF
IMM GRANULOCYTES # BLD AUTO: 0.03 K/UL (ref 0–0.04)
IMM GRANULOCYTES NFR BLD AUTO: 0.4 % (ref 0–0.5)
INTERVENTRICULAR SEPTUM: 0.59 CM (ref 0.6–1.1)
IVRT: 86.51 MSEC
KETONES UR QL STRIP: NEGATIVE
LA MAJOR: 6.99 CM
LA MINOR: 6.54 CM
LA WIDTH: 5.14 CM
LEFT ATRIUM SIZE: 4.85 CM
LEFT ATRIUM VOLUME INDEX: 83.7 ML/M2
LEFT ATRIUM VOLUME: 143.19 CM3
LEFT INTERNAL DIMENSION IN SYSTOLE: 6.11 CM (ref 2.1–4)
LEFT VENTRICLE DIASTOLIC VOLUME INDEX: 122.51 ML/M2
LEFT VENTRICLE DIASTOLIC VOLUME: 209.49 ML
LEFT VENTRICLE MASS INDEX: 126 G/M2
LEFT VENTRICLE SYSTOLIC VOLUME INDEX: 109.6 ML/M2
LEFT VENTRICLE SYSTOLIC VOLUME: 187.35 ML
LEFT VENTRICULAR INTERNAL DIMENSION IN DIASTOLE: 6.41 CM (ref 3.5–6)
LEFT VENTRICULAR MASS: 215.49 G
LEUKOCYTE ESTERASE UR QL STRIP: ABNORMAL
LV LATERAL E/E' RATIO: 13.25 M/S
LV SEPTAL E/E' RATIO: 53 M/S
LYMPHOCYTES # BLD AUTO: 1.1 K/UL (ref 1–4.8)
LYMPHOCYTES NFR BLD: 15.3 % (ref 18–48)
MAGNESIUM SERPL-MCNC: 1.9 MG/DL (ref 1.6–2.6)
MCH RBC QN AUTO: 26.8 PG (ref 27–31)
MCHC RBC AUTO-ENTMCNC: 31.1 G/DL (ref 32–36)
MCV RBC AUTO: 86 FL (ref 82–98)
MICROSCOPIC COMMENT: ABNORMAL
MONOCYTES # BLD AUTO: 0.4 K/UL (ref 0.3–1)
MONOCYTES NFR BLD: 5.9 % (ref 4–15)
MV PEAK A VEL: 0.49 M/S
MV PEAK E VEL: 1.06 M/S
MV STENOSIS PRESSURE HALF TIME: 38.56 MS
MV VALVE AREA P 1/2 METHOD: 5.71 CM2
NEUTROPHILS # BLD AUTO: 5.5 K/UL (ref 1.8–7.7)
NEUTROPHILS NFR BLD: 77.6 % (ref 38–73)
NITRITE UR QL STRIP: NEGATIVE
NRBC BLD-RTO: 0 /100 WBC
OB PNL STL: NEGATIVE
PH UR STRIP: 6 [PH] (ref 5–8)
PHOSPHATE SERPL-MCNC: 3.6 MG/DL (ref 2.7–4.5)
PISA TR MAX VEL: 3.59 M/S
PLATELET # BLD AUTO: 257 K/UL (ref 150–450)
PMV BLD AUTO: 10.8 FL (ref 9.2–12.9)
POTASSIUM SERPL-SCNC: 3.9 MMOL/L (ref 3.5–5.1)
PROT SERPL-MCNC: 6.9 G/DL (ref 6–8.4)
PROT UR QL STRIP: ABNORMAL
PV PEAK VELOCITY: 0.76 CM/S
RA MAJOR: 6.32 CM
RA PRESSURE: 15 MMHG
RA WIDTH: 6.57 CM
RBC # BLD AUTO: 4.37 M/UL (ref 4–5.4)
RBC #/AREA URNS HPF: 4 /HPF (ref 0–4)
RIGHT VENTRICULAR END-DIASTOLIC DIMENSION: 4.57 CM
RV AG STL QL IA.RAPID: NEGATIVE
SARS-COV-2 RDRP RESP QL NAA+PROBE: NEGATIVE
SINUS: 2.42 CM
SODIUM SERPL-SCNC: 141 MMOL/L (ref 136–145)
SP GR UR STRIP: >1.03 (ref 1–1.03)
SQUAMOUS #/AREA URNS HPF: 3 /HPF
STJ: 2.24 CM
TDI LATERAL: 0.08 M/S
TDI SEPTAL: 0.02 M/S
TDI: 0.05 M/S
TR MAX PG: 52 MMHG
TRICUSPID ANNULAR PLANE SYSTOLIC EXCURSION: 1.29 CM
TROPONIN I SERPL DL<=0.01 NG/ML-MCNC: <0.006 NG/ML (ref 0–0.03)
TSH SERPL DL<=0.005 MIU/L-ACNC: 3.72 UIU/ML (ref 0.4–4)
TV REST PULMONARY ARTERY PRESSURE: 67 MMHG
URN SPEC COLLECT METH UR: ABNORMAL
UROBILINOGEN UR STRIP-ACNC: NEGATIVE EU/DL
WBC # BLD AUTO: 7.07 K/UL (ref 3.9–12.7)
WBC #/AREA STL HPF: NORMAL /[HPF]
WBC #/AREA URNS HPF: 5 /HPF (ref 0–5)
WBC CLUMPS URNS QL MICRO: ABNORMAL

## 2022-01-10 PROCEDURE — 87425 ROTAVIRUS AG IA: CPT | Performed by: STUDENT IN AN ORGANIZED HEALTH CARE EDUCATION/TRAINING PROGRAM

## 2022-01-10 PROCEDURE — 25000003 PHARM REV CODE 250: Performed by: INTERNAL MEDICINE

## 2022-01-10 PROCEDURE — 83993 ASSAY FOR CALPROTECTIN FECAL: CPT | Performed by: STUDENT IN AN ORGANIZED HEALTH CARE EDUCATION/TRAINING PROGRAM

## 2022-01-10 PROCEDURE — 82705 FATS/LIPIDS FECES QUAL: CPT | Performed by: STUDENT IN AN ORGANIZED HEALTH CARE EDUCATION/TRAINING PROGRAM

## 2022-01-10 PROCEDURE — 87449 NOS EACH ORGANISM AG IA: CPT | Performed by: STUDENT IN AN ORGANIZED HEALTH CARE EDUCATION/TRAINING PROGRAM

## 2022-01-10 PROCEDURE — 99220 PR INITIAL OBSERVATION CARE,LEVL III: CPT | Mod: 25,,, | Performed by: INTERNAL MEDICINE

## 2022-01-10 PROCEDURE — 84484 ASSAY OF TROPONIN QUANT: CPT | Performed by: EMERGENCY MEDICINE

## 2022-01-10 PROCEDURE — 83735 ASSAY OF MAGNESIUM: CPT | Performed by: INTERNAL MEDICINE

## 2022-01-10 PROCEDURE — 63600175 PHARM REV CODE 636 W HCPCS: Performed by: EMERGENCY MEDICINE

## 2022-01-10 PROCEDURE — 85025 COMPLETE CBC W/AUTO DIFF WBC: CPT | Performed by: EMERGENCY MEDICINE

## 2022-01-10 PROCEDURE — 82272 OCCULT BLD FECES 1-3 TESTS: CPT | Performed by: STUDENT IN AN ORGANIZED HEALTH CARE EDUCATION/TRAINING PROGRAM

## 2022-01-10 PROCEDURE — 25500020 PHARM REV CODE 255: Performed by: EMERGENCY MEDICINE

## 2022-01-10 PROCEDURE — 87329 GIARDIA AG IA: CPT | Performed by: STUDENT IN AN ORGANIZED HEALTH CARE EDUCATION/TRAINING PROGRAM

## 2022-01-10 PROCEDURE — 63600175 PHARM REV CODE 636 W HCPCS: Performed by: STUDENT IN AN ORGANIZED HEALTH CARE EDUCATION/TRAINING PROGRAM

## 2022-01-10 PROCEDURE — 99220 PR INITIAL OBSERVATION CARE,LEVL III: ICD-10-PCS | Mod: 25,,, | Performed by: INTERNAL MEDICINE

## 2022-01-10 PROCEDURE — 80053 COMPREHEN METABOLIC PANEL: CPT | Performed by: EMERGENCY MEDICINE

## 2022-01-10 PROCEDURE — 85379 FIBRIN DEGRADATION QUANT: CPT | Performed by: EMERGENCY MEDICINE

## 2022-01-10 PROCEDURE — 89055 LEUKOCYTE ASSESSMENT FECAL: CPT | Performed by: STUDENT IN AN ORGANIZED HEALTH CARE EDUCATION/TRAINING PROGRAM

## 2022-01-10 PROCEDURE — 82656 EL-1 FECAL QUAL/SEMIQ: CPT | Performed by: STUDENT IN AN ORGANIZED HEALTH CARE EDUCATION/TRAINING PROGRAM

## 2022-01-10 PROCEDURE — 87045 FECES CULTURE AEROBIC BACT: CPT | Performed by: STUDENT IN AN ORGANIZED HEALTH CARE EDUCATION/TRAINING PROGRAM

## 2022-01-10 PROCEDURE — 84443 ASSAY THYROID STIM HORMONE: CPT | Performed by: EMERGENCY MEDICINE

## 2022-01-10 PROCEDURE — 87046 STOOL CULTR AEROBIC BACT EA: CPT | Mod: 59 | Performed by: STUDENT IN AN ORGANIZED HEALTH CARE EDUCATION/TRAINING PROGRAM

## 2022-01-10 PROCEDURE — 87427 SHIGA-LIKE TOXIN AG IA: CPT | Mod: 59 | Performed by: STUDENT IN AN ORGANIZED HEALTH CARE EDUCATION/TRAINING PROGRAM

## 2022-01-10 PROCEDURE — A4216 STERILE WATER/SALINE, 10 ML: HCPCS | Performed by: STUDENT IN AN ORGANIZED HEALTH CARE EDUCATION/TRAINING PROGRAM

## 2022-01-10 PROCEDURE — 84100 ASSAY OF PHOSPHORUS: CPT | Performed by: INTERNAL MEDICINE

## 2022-01-10 PROCEDURE — 25000003 PHARM REV CODE 250: Performed by: STUDENT IN AN ORGANIZED HEALTH CARE EDUCATION/TRAINING PROGRAM

## 2022-01-10 PROCEDURE — 81000 URINALYSIS NONAUTO W/SCOPE: CPT | Performed by: STUDENT IN AN ORGANIZED HEALTH CARE EDUCATION/TRAINING PROGRAM

## 2022-01-10 PROCEDURE — G0378 HOSPITAL OBSERVATION PER HR: HCPCS

## 2022-01-10 PROCEDURE — U0002 COVID-19 LAB TEST NON-CDC: HCPCS | Performed by: EMERGENCY MEDICINE

## 2022-01-10 PROCEDURE — 87177 OVA AND PARASITES SMEARS: CPT | Performed by: STUDENT IN AN ORGANIZED HEALTH CARE EDUCATION/TRAINING PROGRAM

## 2022-01-10 PROCEDURE — 83880 ASSAY OF NATRIURETIC PEPTIDE: CPT | Performed by: EMERGENCY MEDICINE

## 2022-01-10 PROCEDURE — 87338 HPYLORI STOOL AG IA: CPT | Performed by: STUDENT IN AN ORGANIZED HEALTH CARE EDUCATION/TRAINING PROGRAM

## 2022-01-10 RX ORDER — ENOXAPARIN SODIUM 100 MG/ML
40 INJECTION SUBCUTANEOUS EVERY 24 HOURS
Status: DISCONTINUED | OUTPATIENT
Start: 2022-01-10 | End: 2022-01-11 | Stop reason: HOSPADM

## 2022-01-10 RX ORDER — CHOLECALCIFEROL (VITAMIN D3) 25 MCG
1000 TABLET ORAL DAILY
COMMUNITY

## 2022-01-10 RX ORDER — POLYETHYLENE GLYCOL 3350 17 G/17G
17 POWDER, FOR SOLUTION ORAL 2 TIMES DAILY PRN
Status: DISCONTINUED | OUTPATIENT
Start: 2022-01-10 | End: 2022-01-11 | Stop reason: HOSPADM

## 2022-01-10 RX ORDER — IBUPROFEN 200 MG
16 TABLET ORAL
Status: DISCONTINUED | OUTPATIENT
Start: 2022-01-10 | End: 2022-01-11 | Stop reason: HOSPADM

## 2022-01-10 RX ORDER — NALOXONE HCL 0.4 MG/ML
0.02 VIAL (ML) INJECTION
Status: DISCONTINUED | OUTPATIENT
Start: 2022-01-10 | End: 2022-01-11 | Stop reason: HOSPADM

## 2022-01-10 RX ORDER — ACETAMINOPHEN 325 MG/1
650 TABLET ORAL EVERY 8 HOURS PRN
Status: DISCONTINUED | OUTPATIENT
Start: 2022-01-10 | End: 2022-01-11 | Stop reason: HOSPADM

## 2022-01-10 RX ORDER — HEPARIN SODIUM 5000 [USP'U]/ML
5000 INJECTION, SOLUTION INTRAVENOUS; SUBCUTANEOUS EVERY 8 HOURS
Status: DISCONTINUED | OUTPATIENT
Start: 2022-01-10 | End: 2022-01-10

## 2022-01-10 RX ORDER — ONDANSETRON 8 MG/1
8 TABLET, ORALLY DISINTEGRATING ORAL EVERY 8 HOURS PRN
Status: DISCONTINUED | OUTPATIENT
Start: 2022-01-10 | End: 2022-01-10

## 2022-01-10 RX ORDER — LISINOPRIL 20 MG/1
20 TABLET ORAL DAILY
Status: DISCONTINUED | OUTPATIENT
Start: 2022-01-10 | End: 2022-01-11 | Stop reason: HOSPADM

## 2022-01-10 RX ORDER — FUROSEMIDE 10 MG/ML
40 INJECTION INTRAMUSCULAR; INTRAVENOUS
Status: COMPLETED | OUTPATIENT
Start: 2022-01-10 | End: 2022-01-10

## 2022-01-10 RX ORDER — GLUCAGON 1 MG
1 KIT INJECTION
Status: DISCONTINUED | OUTPATIENT
Start: 2022-01-10 | End: 2022-01-11 | Stop reason: HOSPADM

## 2022-01-10 RX ORDER — AMOXICILLIN 500 MG
1 CAPSULE ORAL DAILY
COMMUNITY

## 2022-01-10 RX ORDER — ASPIRIN 81 MG/1
81 TABLET ORAL DAILY
Status: DISCONTINUED | OUTPATIENT
Start: 2022-01-10 | End: 2022-01-11 | Stop reason: HOSPADM

## 2022-01-10 RX ORDER — POTASSIUM CHLORIDE 20 MEQ/1
40 TABLET, EXTENDED RELEASE ORAL ONCE
Status: COMPLETED | OUTPATIENT
Start: 2022-01-10 | End: 2022-01-10

## 2022-01-10 RX ORDER — UBIDECARENONE 30 MG
30 CAPSULE ORAL 3 TIMES DAILY
COMMUNITY

## 2022-01-10 RX ORDER — IBUPROFEN 200 MG
24 TABLET ORAL
Status: DISCONTINUED | OUTPATIENT
Start: 2022-01-10 | End: 2022-01-11 | Stop reason: HOSPADM

## 2022-01-10 RX ORDER — FUROSEMIDE 10 MG/ML
60 INJECTION INTRAMUSCULAR; INTRAVENOUS
Status: DISCONTINUED | OUTPATIENT
Start: 2022-01-10 | End: 2022-01-11 | Stop reason: HOSPADM

## 2022-01-10 RX ORDER — AMIODARONE HYDROCHLORIDE 200 MG/1
400 TABLET ORAL DAILY
Status: DISCONTINUED | OUTPATIENT
Start: 2022-01-10 | End: 2022-01-11 | Stop reason: HOSPADM

## 2022-01-10 RX ORDER — TALC
6 POWDER (GRAM) TOPICAL NIGHTLY
Status: DISCONTINUED | OUTPATIENT
Start: 2022-01-10 | End: 2022-01-11 | Stop reason: HOSPADM

## 2022-01-10 RX ORDER — SODIUM CHLORIDE 0.9 % (FLUSH) 0.9 %
10 SYRINGE (ML) INJECTION EVERY 6 HOURS PRN
Status: DISCONTINUED | OUTPATIENT
Start: 2022-01-10 | End: 2022-01-11 | Stop reason: HOSPADM

## 2022-01-10 RX ADMIN — FUROSEMIDE 60 MG: 10 INJECTION, SOLUTION INTRAMUSCULAR; INTRAVENOUS at 06:01

## 2022-01-10 RX ADMIN — IOHEXOL 100 ML: 350 INJECTION, SOLUTION INTRAVENOUS at 03:01

## 2022-01-10 RX ADMIN — Medication 6 MG: at 10:01

## 2022-01-10 RX ADMIN — FUROSEMIDE 60 MG: 10 INJECTION, SOLUTION INTRAMUSCULAR; INTRAVENOUS at 10:01

## 2022-01-10 RX ADMIN — HEPARIN SODIUM 5000 UNITS: 5000 INJECTION INTRAVENOUS; SUBCUTANEOUS at 05:01

## 2022-01-10 RX ADMIN — AMIODARONE HYDROCHLORIDE 400 MG: 200 TABLET ORAL at 10:01

## 2022-01-10 RX ADMIN — FUROSEMIDE 40 MG: 10 INJECTION, SOLUTION INTRAMUSCULAR; INTRAVENOUS at 05:01

## 2022-01-10 RX ADMIN — METOPROLOL SUCCINATE 12.5 MG: 25 TABLET, EXTENDED RELEASE ORAL at 10:01

## 2022-01-10 RX ADMIN — LISINOPRIL 20 MG: 20 TABLET ORAL at 09:01

## 2022-01-10 RX ADMIN — POTASSIUM CHLORIDE 40 MEQ: 1500 TABLET, EXTENDED RELEASE ORAL at 05:01

## 2022-01-10 RX ADMIN — Medication 10 ML: at 05:01

## 2022-01-10 RX ADMIN — ASPIRIN 81 MG: 81 TABLET, COATED ORAL at 09:01

## 2022-01-10 NOTE — PROGRESS NOTES
WRITTEN HEALTHCARE DISCHARGE INFORMATION      Things that YOU are RESPONSIBLE for to Manage Your Care At Home:     1. Getting your prescriptions filled.  2. Taking you medications as directed. DO NOT MISS ANY DOSES!  3. Going to your follow-up doctor appointments. This is important because it allows the doctor to monitor your progress and to determine if any changes need to be made to your treatment plan.     If you are unable to make your follow up appointments, please call the number listed and reschedule this appointment.      ____________HELP AT HOME____________________     Experiencing any SIGNS or SYMPTOMS: YOU CAN     Schedule a same day appopintment with your Primary Care Doctor or  you can call [facility] On Call Nurse Care Line for 24/7 assistance at [phone]     If you are experience any signs or symptoms that have become severe, Call 911 and come to your nearest Emergency Room.     Thank you for choosing [facility] and allowing us to care for you.   From your care management team:      You should receive a call from [facility] Discharge Department within 48-72 hours to help manage your care after discharge. Please try to make sure that you answer your phone for this important phone call.     Follow-up Information     Jefferson Johnson MD.    Specialties: Cardiology, INTERVENTIONAL CARDIOLOGY  Why: please call to schedule follow up appointment with PCP as needed post hospital discharge  Contact information:  [address]  [address]  [facility] [address]  [phone]

## 2022-01-10 NOTE — CONSULTS
[facility] Emergency Dept  Cardiology  Consult Note    Patient Name: Steff Jaquez  MRN: [id number]  Admission Date: 1/10/2022  Hospital Length of Stay: 0 days  Code Status: Full Code   Attending Provider: Harjit Blanc MD   Consulting Provider: Mingo Pedraza MD  Primary Care Physician: Jefferson Johnson MD  Principal Problem:Acute on chronic congestive heart failure    Patient information was obtained from patient and ER records.     Inpatient consult to Cardiology  Consult performed by: Mingo Pedraza MD  Consult ordered by: Adrian Flores MD        Subjective:     Chief Complaint:  sob     HPI:   PATIENT WITH KNOWN HISTORY OF  nonischemic cardiomyopathy with EF of 10%.  Had plans to undergo a ICD implantation at outside hospital later this month.  Came in with acute decompensated heart failure.  No chest pains.  Orthopnea, PND.  Recent cardiac catheterization showed no significant coronary artery disease.  Some episodes of wide complex tachycardia, brief, self-limited noted on tele monitoring.  Now feeling better, although still not back to baseline.        HPI:      Steff Jaquez is a 66 y.o. female who  has a past medical history of Hypertension and HFrEF (10%) , presented to the ED with CC of SOB. Patient has been feeling SOB for a couple days now, but often gets SOB with poor heart function. She is lying flat without oxygen. She has a weight diary and is only 2-3 lbs over her dry weight of 160 lbs. Shis waiting for BV placement on 1/28th, but thinks this is too long. [place]. Recent TTE revealed an EF of 10%, G1 DD, servere LAE, moderate-severe MR, and moderate PRIYA. LHC with Dr. Pedraza was performed on 11/16/2021 and luminal irregularities were seen but no significant coronary artery stenosis was found. D-dimer elevated at 7.4. CTA chest w/o PE but showed with bilateral large pleural effusions and cardiomegaly. Short run of unsustained Vtach in ED. All similar to  previous admission. BNP 2300 with neg trop. Started on IV diuresis. CT abdomen showed Heterogeneous, incomplete opacification of the bilateral ovarian/gonadal veins with possible filling defects- this may relate to contrast bolus face timing/contrast mixing, although ovarian vein thrombophlebitis is not excluded. Denied and urinary symptoms although some bladder thickening and fluid surrounding. Patient did admit to having diarrhea/loose stools 3-6x a day that began in November and has persisted, she did not recall getting abx around that time, however she has always defecated once a day prior to this change. Patient denies any fevers, night sweats, vomit, constipation, abd pain, dysuria, hematuria or hematochezia,  wheezing, CP, HA, dizziness, weakness, hallucinations, SI, HI, or self injury at this time.      Past Medical History:   Diagnosis Date    Cardiomegaly 1/10/2022    HFrEF (heart failure with reduced ejection fraction) 1/10/2022    Hypertension     V-tach 1/10/2022       Past Surgical History:   Procedure Laterality Date    LEFT HEART CATHETERIZATION Left 11/16/2021    Procedure: Left heart cath;  Surgeon: Mingo Pedraza MD;  Location: [facility] CATH LAB;  Service: Cardiology;  Laterality: Left;       Review of patient's allergies indicates:  No Known Allergies    No current facility-administered medications on file prior to encounter.     Current Outpatient Medications on File Prior to Encounter   Medication Sig    aspirin (ECOTRIN) 81 MG EC tablet Take 81 mg by mouth once daily.    furosemide (LASIX) 20 MG tablet Take 1 tablet (20 mg total) by mouth once daily.    lisinopril 10 MG tablet Take 20 mg by mouth once daily.     metoprolol succinate (TOPROL-XL) 25 MG 24 hr tablet Take 0.5 tablets (12.5 mg total) by mouth once daily.     Family History    None       Tobacco Use    Smoking status: Never Smoker    Smokeless tobacco: Never Used   Substance and Sexual Activity    Alcohol use: Never     Drug use: Never    Sexual activity: Not on file     Review of Systems   Constitutional: Negative.   HENT: Negative.    Eyes: Negative.    Cardiovascular: Positive for dyspnea on exertion.   Respiratory: Positive for shortness of breath.    Endocrine: Negative.    Hematologic/Lymphatic: Negative.    Skin: Negative.    Musculoskeletal: Negative.    Gastrointestinal: Negative.    Genitourinary: Negative.    Neurological: Negative.    Psychiatric/Behavioral: Negative.    Allergic/Immunologic: Negative.      Objective:     Vital Signs (Most Recent):  Temp: 97.8 °F (36.6 °C) (01/10/22 0330)  Pulse: 101 (01/10/22 0911)  Resp: 18 (01/10/22 0911)  BP: 124/70 (01/10/22 0902)  SpO2: 100 % (01/10/22 0911) Vital Signs (24h Range):  Temp:  [97.7 °F (36.5 °C)-97.8 °F (36.6 °C)] 97.8 °F (36.6 °C)  Pulse:  [] 101  Resp:  [18-24] 18  SpO2:  [98 %-100 %] 100 %  BP: (124-135)/(70-85) 124/70     Weight: 73.5 kg (162 lb)  Body mass index is 31.64 kg/m².    SpO2: 100 %  O2 Device (Oxygen Therapy): room air      Intake/Output Summary (Last 24 hours) at 1/10/2022 0951  Last data filed at 1/10/2022 0712  Gross per 24 hour   Intake 240 ml   Output 550 ml   Net -310 ml       Lines/Drains/Airways     Peripheral Intravenous Line                 Peripheral IV - Single Lumen 11/15/21 0500 22 G Posterior;Right Hand 56 days         Peripheral IV - Single Lumen 01/10/22 0200 20 G Right Antecubital <1 day         Peripheral IV - Single Lumen 01/10/22 0330 20 G Left Antecubital <1 day                Physical Exam  Constitutional:       Appearance: Normal appearance. She is well-developed.   HENT:      Head: Normocephalic.   Eyes:      Pupils: Pupils are equal, round, and reactive to light.   Cardiovascular:      Rate and Rhythm: Normal rate and regular rhythm.   Pulmonary:      Effort: Pulmonary effort is normal.      Breath sounds: Rales present.   Abdominal:      General: Bowel sounds are normal.      Palpations: Abdomen is soft.       Tenderness: There is no abdominal tenderness.   Musculoskeletal:         General: Normal range of motion.      Cervical back: Normal range of motion and neck supple.   Skin:     General: Skin is warm.   Neurological:      Mental Status: She is alert and oriented to person, place, and time.         Significant Labs:   BMP:   Recent Labs   Lab 01/10/22  0154         K 3.9      CO2 20*   BUN 29*   CREATININE 1.4   CALCIUM 8.7   , CMP   Recent Labs   Lab 01/10/22  0154      K 3.9      CO2 20*      BUN 29*   CREATININE 1.4   CALCIUM 8.7   PROT 6.9   ALBUMIN 3.3*   BILITOT 1.9*   ALKPHOS 113   AST 34   ALT 50*   ANIONGAP 16   ESTGFRAFRICA 45*   EGFRNONAA 39*   , CBC   Recent Labs   Lab 01/10/22  0154   WBC 7.07   HGB 11.7*   HCT 37.6      , INR No results for input(s): INR, PROTIME in the last 48 hours., Lipid Panel No results for input(s): CHOL, HDL, LDLCALC, TRIG, CHOLHDL in the last 48 hours., Troponin   Recent Labs   Lab 01/10/22  0154   TROPONINI <0.006    and All pertinent lab results from the last 24 hours have been reviewed.    Significant Imaging: Echocardiogram:   Transthoracic echo (TTE) complete (Cupid Only):   Results for orders placed or performed during the hospital encounter of 11/14/21   Echo   Result Value Ref Range    BSA 1.81 m2    TDI SEPTAL 0.02 m/s    LV SEPTAL E/E' RATIO 39.50 m/s    LA WIDTH 4.95 cm    AORTIC VALVE CUSP SEPERATION 1.63 cm    PV PEAK VELOCITY 0.88 cm/s    LVIDd 7.03 (A) 3.5 - 6.0 cm    IVS 0.68 0.6 - 1.1 cm    Posterior Wall 0.70 0.6 - 1.1 cm    Ao root annulus 2.60 cm    LVIDs 6.70 (A) 2.1 - 4.0 cm    FS 5 28 - 44 %    LA volume 89.06 cm3    Sinus 2.76 cm    STJ 2.16 cm    Ascending aorta 2.45 cm    LV mass 206.43 g    LA size 4.10 cm    RVDD 3.32 cm    TAPSE 2.01 cm    RV S' 7.38 cm/s    Left Ventricle Relative Wall Thickness 0.20 cm    AV mean gradient 4 mmHg    AV valve area 1.42 cm2    AV Velocity Ratio 0.56     AV index  (prosthetic) 0.51     E/A ratio 0.85     E wave deceleration time 127.91 msec    IVRT 114.19 msec    LVOT diameter 1.88 cm    LVOT area 2.8 cm2    LVOT peak shaun 0.69 m/s    LVOT peak VTI 10.52 cm    Ao peak shaun 1.24 m/s    Ao VTI 20.55 cm    LVOT stroke volume 29.19 cm3    AV peak gradient 6 mmHg    MV Peak E Shaun 0.79 m/s    TR Max Shaun 2.39 m/s    MV Peak A Shaun 0.93 m/s    LV Systolic Volume 231.09 mL    LV Systolic Volume Index 127.7 mL/m2    LV Diastolic Volume 257.60 mL    LV Diastolic Volume Index 142.32 mL/m2    LA Volume Index 49.2 mL/m2    LV Mass Index 114 g/m2    RA Major Axis 4.55 cm    Left Atrium Minor Axis 5.05 cm    Left Atrium Major Axis 5.28 cm    Triscuspid Valve Regurgitation Peak Gradient 23 mmHg    RA Width 3.95 cm    Right Atrial Pressure (from IVC) 3 mmHg    EF 10 %    TV rest pulmonary artery pressure 26 mmHg    Narrative    · The left ventricle is severely enlarged with severely decreased systolic   function.  · The estimated ejection fraction is 10%.  · Grade I left ventricular diastolic dysfunction.  · Severe left atrial enlargement.  · Small pericardial effusion.  · Normal right ventricular size with normal right ventricular systolic   function.  · Moderate-to-severe mitral regurgitation.  · Mild tricuspid regurgitation.  · Moderate right atrial enlargement.  · Normal central venous pressure (3 mmHg).  · The estimated PA systolic pressure is 26 mmHg.        Assessment and Plan:     * Acute on chronic congestive heart failure  Patient is identified as having Combined Systolic and Diastolic heart failure that is Acute on chronic. CHF is currently uncontrolled. Latest ECHO performed and demonstrates- Results for orders placed during the hospital encounter of 11/14/21    Echo    Interpretation Summary  · The left ventricle is severely enlarged with severely decreased systolic function.  · The estimated ejection fraction is 10%.  · Grade I left ventricular diastolic dysfunction.  · Severe left  atrial enlargement.  · Small pericardial effusion.  · Normal right ventricular size with normal right ventricular systolic function.  · Moderate-to-severe mitral regurgitation.  · Mild tricuspid regurgitation.  · Moderate right atrial enlargement.  · Normal central venous pressure (3 mmHg).  · The estimated PA systolic pressure is 26 mmHg.  . Continue guideline directed medical therapy and monitor clinical status closely. Monitor on telemetry. Patient is on CHF pathway.  Monitor strict Is&Os and daily weights.  Place on fluid restriction of 1.5 L. Continue to stress to patient importance of self efficacy and  on diet for CHF. Last BNP reviewed- and noted below   Recent Labs   Lab 01/10/22  0154   BNP 2,309*   .      V-tach  Has plans to undergo AICD later this month.  Till then will cover with LifeVest.  LifeVest has been ordered.  Also start amiodarone 400 mg b.i.d. for 12 days followed by 200 mg daily.    HFrEF (heart failure with reduced ejection fraction)        HTN (hypertension)            VTE Risk Mitigation (From admission, onward)         Ordered     enoxaparin injection 40 mg  Daily         01/10/22 0833     IP VTE HIGH RISK PATIENT  Once         01/10/22 0338     Place sequential compression device  Until discontinued         01/10/22 0338                Thank you for your consult. I will follow-up with patient. Please contact us if you have any additional questions.    Mingo Pedraza MD  Cardiology   [facility] - Emergency Dept

## 2022-01-10 NOTE — PLAN OF CARE
01/10/22 0856   Discharge Planning   Assessment Type Discharge Planning Brief Assessment   Resource/Environmental Concerns none   Support Systems Children   Equipment Currently Used at Home none   Current Living Arrangements home/apartment/condo   Patient/Family Anticipates Transition to home with family   Patient/Family Anticipated Services at Transition none   DME Needed Upon Discharge  none   Discharge Plan A Home with family  (with follow up)     [id number] - [place], [place] - [address]  [address] [email or web address] [address]  Phone: [phone] Fax: [phone]

## 2022-01-10 NOTE — HPI
Steff Jaquez is a 66 y.o. female who  has a past medical history of Hypertension and HFrEF (10%) , presented to the ED with CC of SOB. Patient has been feeling SOB for a couple days now, but often gets SOB with poor heart function. She is lying flat without oxygen. She has a weight diary and is only 2-3 lbs over her dry weight of 160 lbs. Shis waiting for BV placement on 1/28th, but thinks this is too long. [place]. Recent TTE revealed an EF of 10%, G1 DD, servere LAE, moderate-severe MR, and moderate PRIYA. LHC with Dr. Pedraza was performed on 11/16/2021 and luminal irregularities were seen but no significant coronary artery stenosis was found. D-dimer elevated at 7.4. CTA chest w/o PE but showed with bilateral large pleural effusions and cardiomegaly. Short run of unsustained Vtach in ED. All similar to previous admission. BNP 2300 with neg trop. Started on IV diuresis. CT abdomen showed Heterogeneous, incomplete opacification of the bilateral ovarian/gonadal veins with possible filling defects- this may relate to contrast bolus face timing/contrast mixing, although ovarian vein thrombophlebitis is not excluded. Denied and urinary symptoms although some bladder thickening and fluid surrounding. Patient did admit to having diarrhea/loose stools 3-6x a day that began in November and has persisted, she did not recall getting abx around that time, however she has always defecated once a day prior to this change. Patient denies any fevers, night sweats, vomit, constipation, abd pain, dysuria, hematuria or hematochezia,  wheezing, CP, HA, dizziness, weakness, hallucinations, SI, HI, or self injury at this time.

## 2022-01-10 NOTE — ASSESSMENT & PLAN NOTE
She has a weight diary and is only 2-3 lbs over her dry weight of 160 lbs.   Shis waiting for BV placement on 1/28th  [place]   Recent TTE revealed an EF of 10%, G1 DD, servere LAE, moderate-severe MR, and moderate PRIYA.   LHC with Dr. Pedraza was performed on 11/16/2021 and luminal irregularities were seen but        no significant coronary artery stenosis was found.  -BNP 2300 with neg trop.   -Started on IV diuresis, will prob only need 1 day IV diuresis

## 2022-01-10 NOTE — ASSESSMENT & PLAN NOTE
Short run of unsustained Vtach in ED, did this last admission as well  -Tele  -electrolyte repletion

## 2022-01-10 NOTE — PHARMACY MED REC
"  Admission Medication History     The home medication history was taken by Emiliana Anderson CPhT.      You may go to "Admission" then "Reconcile Home Medications" tabs to review and/or act upon these items.      The home medication list has been updated by the Pharmacy department.    Please read ALL comments highlighted in yellow.    Please address this information as you see fit.     Feel free to contact us if you have any questions or require assistance.      The medications listed below were removed from the home medication list. Please reorder if appropriate:  Patient reports no longer taking the following medication(s):                 Potential issues to be addressed PRIOR TO DISCHARGE  Patient requested refills for the following medications: (9)  Nitroglycerin (2005)      Patient was able to verify medications at the bedside and was requesting Nitroglycerin to be refilled.      Emiliana Anderson CP.  [phone]                  .          "

## 2022-01-10 NOTE — SUBJECTIVE & OBJECTIVE
Past Medical History:   Diagnosis Date    Cardiomegaly 1/10/2022    HFrEF (heart failure with reduced ejection fraction) 1/10/2022    Hypertension     V-tach 1/10/2022       Past Surgical History:   Procedure Laterality Date    LEFT HEART CATHETERIZATION Left 11/16/2021    Procedure: Left heart cath;  Surgeon: Mingo Pedraza MD;  Location: [facility] CATH LAB;  Service: Cardiology;  Laterality: Left;       Review of patient's allergies indicates:  No Known Allergies    No current facility-administered medications on file prior to encounter.     Current Outpatient Medications on File Prior to Encounter   Medication Sig    aspirin (ECOTRIN) 81 MG EC tablet Take 81 mg by mouth once daily.    furosemide (LASIX) 20 MG tablet Take 1 tablet (20 mg total) by mouth once daily.    lisinopril 10 MG tablet Take 20 mg by mouth once daily.     metoprolol succinate (TOPROL-XL) 25 MG 24 hr tablet Take 0.5 tablets (12.5 mg total) by mouth once daily.     Family History    None       Tobacco Use    Smoking status: Never Smoker    Smokeless tobacco: Never Used   Substance and Sexual Activity    Alcohol use: Never    Drug use: Never    Sexual activity: Not on file     Review of Systems   Constitutional: Negative.   HENT: Negative.    Eyes: Negative.    Cardiovascular: Positive for dyspnea on exertion.   Respiratory: Positive for shortness of breath.    Endocrine: Negative.    Hematologic/Lymphatic: Negative.    Skin: Negative.    Musculoskeletal: Negative.    Gastrointestinal: Negative.    Genitourinary: Negative.    Neurological: Negative.    Psychiatric/Behavioral: Negative.    Allergic/Immunologic: Negative.      Objective:     Vital Signs (Most Recent):  Temp: 97.8 °F (36.6 °C) (01/10/22 0330)  Pulse: 101 (01/10/22 0911)  Resp: 18 (01/10/22 0911)  BP: 124/70 (01/10/22 0902)  SpO2: 100 % (01/10/22 0911) Vital Signs (24h Range):  Temp:  [97.7 °F (36.5 °C)-97.8 °F (36.6 °C)] 97.8 °F (36.6 °C)  Pulse:  [] 101  Resp:   [18-24] 18  SpO2:  [98 %-100 %] 100 %  BP: (124-135)/(70-85) 124/70     Weight: 73.5 kg (162 lb)  Body mass index is 31.64 kg/m².    SpO2: 100 %  O2 Device (Oxygen Therapy): room air      Intake/Output Summary (Last 24 hours) at 1/10/2022 0951  Last data filed at 1/10/2022 0712  Gross per 24 hour   Intake 240 ml   Output 550 ml   Net -310 ml       Lines/Drains/Airways     Peripheral Intravenous Line                 Peripheral IV - Single Lumen 11/15/21 0500 22 G Posterior;Right Hand 56 days         Peripheral IV - Single Lumen 01/10/22 0200 20 G Right Antecubital <1 day         Peripheral IV - Single Lumen 01/10/22 0330 20 G Left Antecubital <1 day                Physical Exam  Constitutional:       Appearance: Normal appearance. She is well-developed.   HENT:      Head: Normocephalic.   Eyes:      Pupils: Pupils are equal, round, and reactive to light.   Cardiovascular:      Rate and Rhythm: Normal rate and regular rhythm.   Pulmonary:      Effort: Pulmonary effort is normal.      Breath sounds: Rales present.   Abdominal:      General: Bowel sounds are normal.      Palpations: Abdomen is soft.      Tenderness: There is no abdominal tenderness.   Musculoskeletal:         General: Normal range of motion.      Cervical back: Normal range of motion and neck supple.   Skin:     General: Skin is warm.   Neurological:      Mental Status: She is alert and oriented to person, place, and time.         Significant Labs:   BMP:   Recent Labs   Lab 01/10/22  0154         K 3.9      CO2 20*   BUN 29*   CREATININE 1.4   CALCIUM 8.7   , CMP   Recent Labs   Lab 01/10/22  0154      K 3.9      CO2 20*      BUN 29*   CREATININE 1.4   CALCIUM 8.7   PROT 6.9   ALBUMIN 3.3*   BILITOT 1.9*   ALKPHOS 113   AST 34   ALT 50*   ANIONGAP 16   ESTGFRAFRICA 45*   EGFRNONAA 39*   , CBC   Recent Labs   Lab 01/10/22  0154   WBC 7.07   HGB 11.7*   HCT 37.6      , INR No results for input(s): INR, PROTIME  in the last 48 hours., Lipid Panel No results for input(s): CHOL, HDL, LDLCALC, TRIG, CHOLHDL in the last 48 hours., Troponin   Recent Labs   Lab 01/10/22  0154   TROPONINI <0.006    and All pertinent lab results from the last 24 hours have been reviewed.    Significant Imaging: Echocardiogram:   Transthoracic echo (TTE) complete (Cupid Only):   Results for orders placed or performed during the hospital encounter of 11/14/21   Echo   Result Value Ref Range    BSA 1.81 m2    TDI SEPTAL 0.02 m/s    LV SEPTAL E/E' RATIO 39.50 m/s    LA WIDTH 4.95 cm    AORTIC VALVE CUSP SEPERATION 1.63 cm    PV PEAK VELOCITY 0.88 cm/s    LVIDd 7.03 (A) 3.5 - 6.0 cm    IVS 0.68 0.6 - 1.1 cm    Posterior Wall 0.70 0.6 - 1.1 cm    Ao root annulus 2.60 cm    LVIDs 6.70 (A) 2.1 - 4.0 cm    FS 5 28 - 44 %    LA volume 89.06 cm3    Sinus 2.76 cm    STJ 2.16 cm    Ascending aorta 2.45 cm    LV mass 206.43 g    LA size 4.10 cm    RVDD 3.32 cm    TAPSE 2.01 cm    RV S' 7.38 cm/s    Left Ventricle Relative Wall Thickness 0.20 cm    AV mean gradient 4 mmHg    AV valve area 1.42 cm2    AV Velocity Ratio 0.56     AV index (prosthetic) 0.51     E/A ratio 0.85     E wave deceleration time 127.91 msec    IVRT 114.19 msec    LVOT diameter 1.88 cm    LVOT area 2.8 cm2    LVOT peak shaun 0.69 m/s    LVOT peak VTI 10.52 cm    Ao peak shaun 1.24 m/s    Ao VTI 20.55 cm    LVOT stroke volume 29.19 cm3    AV peak gradient 6 mmHg    MV Peak E Shaun 0.79 m/s    TR Max Shaun 2.39 m/s    MV Peak A Shaun 0.93 m/s    LV Systolic Volume 231.09 mL    LV Systolic Volume Index 127.7 mL/m2    LV Diastolic Volume 257.60 mL    LV Diastolic Volume Index 142.32 mL/m2    LA Volume Index 49.2 mL/m2    LV Mass Index 114 g/m2    RA Major Axis 4.55 cm    Left Atrium Minor Axis 5.05 cm    Left Atrium Major Axis 5.28 cm    Triscuspid Valve Regurgitation Peak Gradient 23 mmHg    RA Width 3.95 cm    Right Atrial Pressure (from IVC) 3 mmHg    EF 10 %    TV rest pulmonary artery pressure 26 mmHg     Narrative    · The left ventricle is severely enlarged with severely decreased systolic   function.  · The estimated ejection fraction is 10%.  · Grade I left ventricular diastolic dysfunction.  · Severe left atrial enlargement.  · Small pericardial effusion.  · Normal right ventricular size with normal right ventricular systolic   function.  · Moderate-to-severe mitral regurgitation.  · Mild tricuspid regurgitation.  · Moderate right atrial enlargement.  · Normal central venous pressure (3 mmHg).  · The estimated PA systolic pressure is 26 mmHg.

## 2022-01-10 NOTE — ASSESSMENT & PLAN NOTE
Patient is identified as having Combined Systolic and Diastolic heart failure that is Acute on chronic. CHF is currently uncontrolled. Latest ECHO performed and demonstrates- Results for orders placed during the hospital encounter of 11/14/21    Echo    Interpretation Summary  · The left ventricle is severely enlarged with severely decreased systolic function.  · The estimated ejection fraction is 10%.  · Grade I left ventricular diastolic dysfunction.  · Severe left atrial enlargement.  · Small pericardial effusion.  · Normal right ventricular size with normal right ventricular systolic function.  · Moderate-to-severe mitral regurgitation.  · Mild tricuspid regurgitation.  · Moderate right atrial enlargement.  · Normal central venous pressure (3 mmHg).  · The estimated PA systolic pressure is 26 mmHg.  . Continue guideline directed medical therapy and monitor clinical status closely. Monitor on telemetry. Patient is on CHF pathway.  Monitor strict Is&Os and daily weights.  Place on fluid restriction of 1.5 L. Continue to stress to patient importance of self efficacy and  on diet for CHF. Last BNP reviewed- and noted below   Recent Labs   Lab 01/10/22  0154   BNP 2,309*   .

## 2022-01-10 NOTE — HPI
PATIENT WITH KNOWN HISTORY OF  nonischemic cardiomyopathy with EF of 10%.  Had plans to undergo a ICD implantation at outside hospital later this month.  Came in with acute decompensated heart failure.  No chest pains.  Orthopnea, PND.  Recent cardiac catheterization showed no significant coronary artery disease.  Some episodes of wide complex tachycardia, brief, self-limited noted on tele monitoring.  Now feeling better, although still not back to baseline.        HPI:      Steff Jaquez is a 66 y.o. female who  has a past medical history of Hypertension and HFrEF (10%) , presented to the ED with CC of SOB. Patient has been feeling SOB for a couple days now, but often gets SOB with poor heart function. She is lying flat without oxygen. She has a weight diary and is only 2-3 lbs over her dry weight of 160 lbs. Shis waiting for BV placement on 1/28th, but thinks this is too long. [place]. Recent TTE revealed an EF of 10%, G1 DD, servere LAE, moderate-severe MR, and moderate PRIYA. LHC with Dr. Pedraza was performed on 11/16/2021 and luminal irregularities were seen but no significant coronary artery stenosis was found. D-dimer elevated at 7.4. CTA chest w/o PE but showed with bilateral large pleural effusions and cardiomegaly. Short run of unsustained Vtach in ED. All similar to previous admission. BNP 2300 with neg trop. Started on IV diuresis. CT abdomen showed Heterogeneous, incomplete opacification of the bilateral ovarian/gonadal veins with possible filling defects- this may relate to contrast bolus face timing/contrast mixing, although ovarian vein thrombophlebitis is not excluded. Denied and urinary symptoms although some bladder thickening and fluid surrounding. Patient did admit to having diarrhea/loose stools 3-6x a day that began in November and has persisted, she did not recall getting abx around that time, however she has always defecated once a day prior to this  change. Patient denies any fevers, night sweats, vomit, constipation, abd pain, dysuria, hematuria or hematochezia,  wheezing, CP, HA, dizziness, weakness, hallucinations, SI, HI, or self injury at this time.

## 2022-01-10 NOTE — ASSESSMENT & PLAN NOTE
Has plans to undergo AICD later this month.  Till then will cover with LifeVest.  LifeVest has been ordered.  Also start amiodarone 400 mg b.i.d. for 12 days followed by 200 mg daily.

## 2022-01-10 NOTE — ASSESSMENT & PLAN NOTE
Chronic diarrhea >2m  3-6x a day, baseline once a day  -Will check for Cdiff and other infectious signs

## 2022-01-10 NOTE — ED PROVIDER NOTES
"Encounter Date: 1/9/2022       History     Chief Complaint   Patient presents with    Shortness of Breath     Pt present ED via personal transportation c/o sob x 1 month.  Pt also c/o of chest discomfort, productive cough and nausea.  Denies ha, dizziness, emesis, numbness or tingling.  Hx of CHF.  Pain 5/10.      66-year-old female with CHF (EF 10%) presents via personal transportation with shortness of breath, ongoing for 1 week.  Patient reports her usual exercise tolerance is approximately 1.5 city blocks.  However, for the last week she has only been able to ambulate within her house.  Currently, she states she thinks she can walk 10-15 feet without having to sit down and rest.  She reports a cough productive of phlegm associated with left-sided chest pain that began today.  No diaphoresis.  No fevers.  Patient has had to add extra pillow to usual two pillows when she lies down to sleep this week.    Patient was admitted here 11/14/21-11/16/21 for shortness of breath, found to be in CHF exacerbation.    Left heart cath 11/16/21: "No significant coronary artery stenosis. Luminal irregularities,"    TTE 11/15/21  · The left ventricle is severely enlarged with severely decreased systolic function.  · The estimated ejection fraction is 10%.  · Grade I left ventricular diastolic dysfunction.  · Severe left atrial enlargement.  · Small pericardial effusion.  · Normal right ventricular size with normal right ventricular systolic function.  · Moderate-to-severe mitral regurgitation.  · Mild tricuspid regurgitation.  · Moderate right atrial enlargement.  · Normal central venous pressure (3 mmHg).  · The estimated PA systolic pressure is 26 mmHg.            Review of patient's allergies indicates:  No Known Allergies  Past Medical History:   Diagnosis Date    Hypertension      Past Surgical History:   Procedure Laterality Date    LEFT HEART CATHETERIZATION Left 11/16/2021    Procedure: Left heart cath;  Surgeon: " Mingo Pedraza MD;  Location: [facility] CATH LAB;  Service: Cardiology;  Laterality: Left;     No family history on file.  Social History     Tobacco Use    Smoking status: Never Smoker    Smokeless tobacco: Never Used   Substance Use Topics    Alcohol use: Never    Drug use: Never     Review of Systems   Constitutional: Negative for fever.   HENT: Negative for sore throat.    Eyes: Negative for visual disturbance.   Respiratory: Positive for cough and shortness of breath.    Cardiovascular: Positive for chest pain (with coughing).   Gastrointestinal: Negative for abdominal pain.   Genitourinary: Negative for dysuria.   Musculoskeletal: Negative for neck stiffness.   Skin: Negative for rash.   Neurological: Negative for syncope.       Physical Exam     Initial Vitals [01/09/22 2221]   BP Pulse Resp Temp SpO2   131/85 97 (!) 24 97.7 °F (36.5 °C) 100 %      MAP       --         Physical Exam    Nursing note and vitals reviewed.  Constitutional: She appears well-developed and well-nourished. She is not diaphoretic.   Awake, alert adult female.   HENT:   Head: Normocephalic and atraumatic.   Eyes: Conjunctivae and EOM are normal. Pupils are equal, round, and reactive to light.   Neck: Neck supple.   Normal range of motion.  Cardiovascular: Normal rate, regular rhythm and intact distal pulses.   Murmur heard.  Pulmonary/Chest: She is in respiratory distress. She has no wheezes. She has no rhonchi. She has rales.   Mild increased work of breathing   Abdominal: Abdomen is soft. There is no abdominal tenderness.   Musculoskeletal:         General: No tenderness or edema. Normal range of motion.      Cervical back: Normal range of motion and neck supple.     Neurological: She is alert and oriented to person, place, and time. She has normal strength.   Moving all extremities.   Skin: Skin is warm and dry. No erythema. No pallor.   Psychiatric: She has a normal mood and affect.         ED Course   Procedures  Labs Reviewed    COMPREHENSIVE METABOLIC PANEL - Abnormal; Notable for the following components:       Result Value    CO2 20 (*)     BUN 29 (*)     Albumin 3.3 (*)     Total Bilirubin 1.9 (*)     ALT 50 (*)     eGFR if  45 (*)     eGFR if non  39 (*)     All other components within normal limits   CBC W/ AUTO DIFFERENTIAL - Abnormal; Notable for the following components:    Hemoglobin 11.7 (*)     MCH 26.8 (*)     MCHC 31.1 (*)     RDW 15.8 (*)     Gran % 77.6 (*)     Lymph % 15.3 (*)     All other components within normal limits   B-TYPE NATRIURETIC PEPTIDE - Abnormal; Notable for the following components:    BNP 2,309 (*)     All other components within normal limits   D DIMER, QUANTITATIVE - Abnormal; Notable for the following components:    D-Dimer 7.35 (*)     All other components within normal limits   TROPONIN I   TSH   SARS-COV-2 (COVID-19) QUALITATIVE PCR   SARS-COV-2 RDRP GENE     EKG Readings: (Independently Interpreted)   22:12: Sinus tach, . L axis. No STEMI. C/w 11/14/21.        Imaging Results          X-Ray Chest AP Portable (Final result)  Result time 01/10/22 00:54:13    Final result by Pete Humphreys MD (01/10/22 00:54:13)                 Impression:      Cardiomegaly with mildly accentuated interstitial lung markings bilaterally which could relate to mild interstitial edema in the appropriate clinical setting.  No new large confluent airspace consolidation appreciated.      Electronically signed by: Pete Humphreys MD  Date:    01/10/2022  Time:    00:54             Narrative:    EXAMINATION:  XR CHEST AP PORTABLE    CLINICAL HISTORY:  shortness of breath;    TECHNIQUE:  Single frontal view of the chest was performed.    COMPARISON:  CTA chest, chest radiograph 11/14/2021    FINDINGS:  Cardiac silhouette is enlarged.  There is atherosclerotic calcification of the thoracic aorta.  There are mildly accentuated interstitial lung markings bilaterally.  No large volume of  pleural fluid appreciated radiographically.  No evidence of pneumothorax.  Osseous structures demonstrate degenerative changes.                              X-Rays:   Independently Interpreted Readings:   Other Readings:  CXR cardiomegaly, mild pulmonary edema    Medications   furosemide injection 40 mg (has no administration in time range)             Medical Decision Making:   Independently Interpreted Test(s):   I have ordered and independently interpreted X-rays - see prior notes.  I have ordered and independently interpreted EKG Reading(s) - see prior notes  Clinical Tests:   Lab Tests: Reviewed and Ordered  Radiological Study: Ordered and Reviewed  Medical Tests: Ordered and Reviewed  ED Management:  EKG no STEMI.    CXR cardiomegaly, mild pulmonary edema.     Labs: BNP 2,309. Troponin normal. Ddimer 7.35, elevated (and significantly over prior).    Patient had self-limited 7 beat run of vtach (see photo).  This was noted on piror admission.    Patient requires observation stay for CHF exacerbation with significant limitation of activity.  I have written her for IV Lasix 40mg as she is only on oral Lasix 20mg PO qday.     Patient is pending CTA chest for elevated ddimer and CT abdom/pelvis with IV contrast as well (to rule out malignancy as PE study was negative last stay).      I discussed patient with nocturnist for [facility] medicine Dr. Adrian Flores.  Patient is aware of plan.  Other:   I have discussed this case with another health care provider.                      Clinical Impression:   Final diagnoses:  [R06.02] Shortness of breath  [I50.9] Acute on chronic congestive heart failure, unspecified heart failure type (Primary)  [I47.2] Ventricular tachycardia  [J81.0] Acute pulmonary edema          ED Disposition Condition    Observation               Allison Ortiz MD  01/10/22 2111

## 2022-01-10 NOTE — H&P
[facility] [place] [facility]  [facility] Medicine  History & Physical    Patient Name: Steff Jaquez  MRN: [id number]  Patient Class: OP- Observation  Admission Date: 1/10/2022  Attending Physician: Harjit Blanc MD   Primary Care Provider: Jefferson Johnson MD         Patient information was obtained from patient, past medical records and ER records.     Subjective:     Principal Problem:Acute on chronic congestive heart failure    Chief Complaint:   Chief Complaint   Patient presents with    Shortness of Breath     Pt present ED via personal transportation c/o sob x 1 month.  Pt also c/o of chest discomfort, productive cough and nausea.  Denies ha, dizziness, emesis, numbness or tingling.  Hx of CHF.  Pain 5/10.         HPI:   Steff Jaquez is a 66 y.o. female who  has a past medical history of Hypertension and HFrEF (10%) , presented to the ED with CC of SOB. Patient has been feeling SOB for a couple days now, but often gets SOB with poor heart function. She is lying flat without oxygen. She has a weight diary and is only 2-3 lbs over her dry weight of 160 lbs. Shis waiting for BV placement on 1/28th, but thinks this is too long. She follows with a cardiologist in the [facility]. Recent TTE revealed an EF of 10%, G1 DD, servere LAE, moderate-severe MR, and moderate PRIYA. LHC with Dr. Pedraza was performed on 11/16/2021 and luminal irregularities were seen but no significant coronary artery stenosis was found. D-dimer elevated at 7.4. CTA chest w/o PE but showed with bilateral large pleural effusions and cardiomegaly. Short run of unsustained Vtach in ED. All similar to previous admission. BNP 2300 with neg trop. Started on IV diuresis. CT abdomen showed Heterogeneous, incomplete opacification of the bilateral ovarian/gonadal veins with possible filling defects- this may relate to contrast bolus face timing/contrast mixing, although ovarian vein thrombophlebitis is not excluded. Denied and urinary symptoms although  some bladder thickening and fluid surrounding. Patient did admit to having diarrhea/loose stools 3-6x a day that began in November and has persisted, she did not recall getting abx around that time, however she has always defecated once a day prior to this change. Patient denies any fevers, night sweats, vomit, constipation, abd pain, dysuria, hematuria or hematochezia,  wheezing, CP, HA, dizziness, weakness, hallucinations, SI, HI, or self injury at this time.        Past Medical History:   Diagnosis Date    Hypertension        Past Surgical History:   Procedure Laterality Date    LEFT HEART CATHETERIZATION Left 11/16/2021    Procedure: Left heart cath;  Surgeon: Mingo Pedraza MD;  Location: [facility] CATH LAB;  Service: Cardiology;  Laterality: Left;       Review of patient's allergies indicates:  No Known Allergies    No current facility-administered medications on file prior to encounter.     Current Outpatient Medications on File Prior to Encounter   Medication Sig    aspirin (ECOTRIN) 81 MG EC tablet Take 81 mg by mouth once daily.    furosemide (LASIX) 20 MG tablet Take 1 tablet (20 mg total) by mouth once daily.    lisinopril 10 MG tablet Take 20 mg by mouth once daily.     metoprolol succinate (TOPROL-XL) 25 MG 24 hr tablet Take 0.5 tablets (12.5 mg total) by mouth once daily.     Family History    None       Tobacco Use    Smoking status: Never Smoker    Smokeless tobacco: Never Used   Substance and Sexual Activity    Alcohol use: Never    Drug use: Never    Sexual activity: Not on file     Review of Systems   Constitutional: Positive for activity change and fatigue. Negative for fever.   HENT: Negative for sore throat and trouble swallowing.    Eyes: Negative for photophobia and visual disturbance.   Respiratory: Positive for shortness of breath. Negative for chest tightness.    Cardiovascular: Positive for leg swelling. Negative for chest pain and palpitations.   Gastrointestinal: Positive for  diarrhea. Negative for abdominal distention, abdominal pain, blood in stool, constipation, nausea and vomiting.   Endocrine: Negative for polydipsia and polyuria.   Genitourinary: Negative for difficulty urinating, dysuria and hematuria.   Musculoskeletal: Negative for neck pain and neck stiffness.   Skin: Negative for pallor and rash.   Allergic/Immunologic: Negative for immunocompromised state.   Neurological: Negative for dizziness, seizures, syncope and facial asymmetry.   Psychiatric/Behavioral: Negative for agitation, behavioral problems and confusion.     Objective:     Vital Signs (Most Recent):  Temp: 97.7 °F (36.5 °C) (01/10/22 0222)  Pulse: 98 (01/10/22 0222)  Resp: 18 (01/10/22 0222)  BP: 135/74 (01/10/22 0222)  SpO2: 98 % (01/10/22 0222) Vital Signs (24h Range):  Temp:  [97.7 °F (36.5 °C)] 97.7 °F (36.5 °C)  Pulse:  [97-98] 98  Resp:  [18-24] 18  SpO2:  [98 %-100 %] 98 %  BP: (131-135)/(74-85) 135/74     Weight: 73.5 kg (162 lb)  Body mass index is 31.64 kg/m².    Physical Exam  Vitals and nursing note reviewed.   Constitutional:       General: She is not in acute distress.     Appearance: Normal appearance. She is well-developed. She is not toxic-appearing or diaphoretic.   HENT:      Head: Normocephalic and atraumatic.      Nose: Nose normal. No congestion.      Mouth/Throat:      Mouth: Mucous membranes are moist.      Pharynx: No oropharyngeal exudate.      Comments: JVD  Eyes:      General: No scleral icterus.     Pupils: Pupils are equal, round, and reactive to light.   Neck:      Thyroid: No thyromegaly.   Cardiovascular:      Rate and Rhythm: Normal rate and regular rhythm.      Heart sounds: No murmur heard.  No gallop.    Pulmonary:      Effort: Pulmonary effort is normal. No respiratory distress.      Breath sounds: No stridor. Rales present. No wheezing.   Abdominal:      General: There is no distension.      Palpations: Abdomen is soft. There is no mass.      Tenderness: There is no  abdominal tenderness. There is no guarding.   Musculoskeletal:         General: No swelling or deformity. Normal range of motion.      Cervical back: Normal range of motion and neck supple. No rigidity.      Right lower leg: Edema present.      Left lower leg: Edema present.   Lymphadenopathy:      Cervical: No cervical adenopathy.   Skin:     General: Skin is warm and dry.      Capillary Refill: Capillary refill takes less than 2 seconds.      Coloration: Skin is not jaundiced.      Findings: No bruising.   Neurological:      General: No focal deficit present.      Mental Status: She is alert and oriented to person, place, and time.      Cranial Nerves: No cranial nerve deficit.   Psychiatric:         Mood and Affect: Mood normal.         Behavior: Behavior normal.         Thought Content: Thought content normal.           CRANIAL NERVES     CN III, IV, VI   Pupils are equal, round, and reactive to light.           Recent Results (from the past 24 hour(s))   Comprehensive metabolic panel    Collection Time: 01/10/22  1:54 AM   Result Value Ref Range    Sodium 141 136 - 145 mmol/L    Potassium 3.9 3.5 - 5.1 mmol/L    Chloride 105 95 - 110 mmol/L    CO2 20 (L) 23 - 29 mmol/L    Glucose 103 70 - 110 mg/dL    BUN 29 (H) 8 - 23 mg/dL    Creatinine 1.4 0.5 - 1.4 mg/dL    Calcium 8.7 8.7 - 10.5 mg/dL    Total Protein 6.9 6.0 - 8.4 g/dL    Albumin 3.3 (L) 3.5 - 5.2 g/dL    Total Bilirubin 1.9 (H) 0.1 - 1.0 mg/dL    Alkaline Phosphatase 113 55 - 135 U/L    AST 34 10 - 40 U/L    ALT 50 (H) 10 - 44 U/L    Anion Gap 16 8 - 16 mmol/L    eGFR if African American 45 (A) >60 mL/min/1.73 m^2    eGFR if non African American 39 (A) >60 mL/min/1.73 m^2   CBC auto differential    Collection Time: 01/10/22  1:54 AM   Result Value Ref Range    WBC 7.07 3.90 - 12.70 K/uL    RBC 4.37 4.00 - 5.40 M/uL    Hemoglobin 11.7 (L) 12.0 - 16.0 g/dL    Hematocrit 37.6 37.0 - 48.5 %    MCV 86 82 - 98 fL    MCH 26.8 (L) 27.0 - 31.0 pg    MCHC 31.1 (L)  32.0 - 36.0 g/dL    RDW 15.8 (H) 11.5 - 14.5 %    Platelets 257 150 - 450 K/uL    MPV 10.8 9.2 - 12.9 fL    Immature Granulocytes 0.4 0.0 - 0.5 %    Gran # (ANC) 5.5 1.8 - 7.7 K/uL    Immature Grans (Abs) 0.03 0.00 - 0.04 K/uL    Lymph # 1.1 1.0 - 4.8 K/uL    Mono # 0.4 0.3 - 1.0 K/uL    Eos # 0.0 0.0 - 0.5 K/uL    Baso # 0.03 0.00 - 0.20 K/uL    nRBC 0 0 /100 WBC    Gran % 77.6 (H) 38.0 - 73.0 %    Lymph % 15.3 (L) 18.0 - 48.0 %    Mono % 5.9 4.0 - 15.0 %    Eosinophil % 0.4 0.0 - 8.0 %    Basophil % 0.4 0.0 - 1.9 %    Differential Method Automated    Brain natriuretic peptide    Collection Time: 01/10/22  1:54 AM   Result Value Ref Range    BNP 2,309 (H) 0 - 99 pg/mL   D dimer, quantitative    Collection Time: 01/10/22  1:54 AM   Result Value Ref Range    D-Dimer 7.35 (H) <0.50 mg/L FEU   Troponin I    Collection Time: 01/10/22  1:54 AM   Result Value Ref Range    Troponin I <0.006 0.000 - 0.026 ng/mL   TSH    Collection Time: 01/10/22  1:54 AM   Result Value Ref Range    TSH 3.720 0.400 - 4.000 uIU/mL   POCT COVID-19 Rapid Screening    Collection Time: 01/10/22  2:23 AM   Result Value Ref Range    POC Rapid COVID Negative Negative     Acceptable Yes        Microbiology Results (last 7 days)     ** No results found for the last 168 hours. **           Imaging Results          X-Ray Chest AP Portable (Final result)  Result time 01/10/22 00:54:13    Final result by Pete Humphreys MD (01/10/22 00:54:13)                 Impression:      Cardiomegaly with mildly accentuated interstitial lung markings bilaterally which could relate to mild interstitial edema in the appropriate clinical setting.  No new large confluent airspace consolidation appreciated.      Electronically signed by: Pete Humphreys MD  Date:    01/10/2022  Time:    00:54             Narrative:    EXAMINATION:  XR CHEST AP PORTABLE    CLINICAL HISTORY:  shortness of breath;    TECHNIQUE:  Single frontal view of the chest was  performed.    COMPARISON:  CTA chest, chest radiograph 11/14/2021    FINDINGS:  Cardiac silhouette is enlarged.  There is atherosclerotic calcification of the thoracic aorta.  There are mildly accentuated interstitial lung markings bilaterally.  No large volume of pleural fluid appreciated radiographically.  No evidence of pneumothorax.  Osseous structures demonstrate degenerative changes.                                          Assessment/Plan:     * Acute on chronic congestive heart failure  She has a weight diary and is only 2-3 lbs over her dry weight of 160 lbs.   Shis waiting for BV placement on 1/28th  [place]   Recent TTE revealed an EF of 10%, G1 DD, servere LAE, moderate-severe MR, and moderate PRIYA.   LHC with Dr. Pedraza was performed on 11/16/2021 and luminal irregularities were seen but        no significant coronary artery stenosis was found.  -BNP 2300 with neg trop.   -Started on IV diuresis, will prob only need 1 day IV diuresis      Diarrhea  Chronic diarrhea >2m  3-6x a day, baseline once a day  -Will check for Cdiff and other infectious signs      V-tach  Short run of unsustained Vtach in ED, did this last admission as well  -Tele  -electrolyte repletion   -Cards      Elevated brain natriuretic peptide (BNP) level  BNP 2900, see ACHF note      HFrEF (heart failure with reduced ejection fraction)  EF 10 %, see ACHF note      Pleural effusion  -IV diuresis, stable from previous    HTN (hypertension)  Continue low dose metop and lisin        VTE Risk Mitigation (From admission, onward)         Ordered     heparin (porcine) injection 5,000 Units  Every 8 hours         01/10/22 0338     IP VTE HIGH RISK PATIENT  Once         01/10/22 0338     Place sequential compression device  Until discontinued         01/10/22 0338                   Adrian Flores MD  Department of Hospital Medicine   [facility] [place] - Emergency Dept

## 2022-01-10 NOTE — SUBJECTIVE & OBJECTIVE
Past Medical History:   Diagnosis Date    Hypertension        Past Surgical History:   Procedure Laterality Date    LEFT HEART CATHETERIZATION Left 11/16/2021    Procedure: Left heart cath;  Surgeon: Mingo Pedraza MD;  Location: [facility] CATH LAB;  Service: Cardiology;  Laterality: Left;       Review of patient's allergies indicates:  No Known Allergies    No current facility-administered medications on file prior to encounter.     Current Outpatient Medications on File Prior to Encounter   Medication Sig    aspirin (ECOTRIN) 81 MG EC tablet Take 81 mg by mouth once daily.    furosemide (LASIX) 20 MG tablet Take 1 tablet (20 mg total) by mouth once daily.    lisinopril 10 MG tablet Take 20 mg by mouth once daily.     metoprolol succinate (TOPROL-XL) 25 MG 24 hr tablet Take 0.5 tablets (12.5 mg total) by mouth once daily.     Family History    None       Tobacco Use    Smoking status: Never Smoker    Smokeless tobacco: Never Used   Substance and Sexual Activity    Alcohol use: Never    Drug use: Never    Sexual activity: Not on file     Review of Systems   Constitutional: Positive for activity change and fatigue. Negative for fever.   HENT: Negative for sore throat and trouble swallowing.    Eyes: Negative for photophobia and visual disturbance.   Respiratory: Positive for shortness of breath. Negative for chest tightness.    Cardiovascular: Positive for leg swelling. Negative for chest pain and palpitations.   Gastrointestinal: Positive for diarrhea. Negative for abdominal distention, abdominal pain, blood in stool, constipation, nausea and vomiting.   Endocrine: Negative for polydipsia and polyuria.   Genitourinary: Negative for difficulty urinating, dysuria and hematuria.   Musculoskeletal: Negative for neck pain and neck stiffness.   Skin: Negative for pallor and rash.   Allergic/Immunologic: Negative for immunocompromised state.   Neurological: Negative for dizziness, seizures, syncope and facial  asymmetry.   Psychiatric/Behavioral: Negative for agitation, behavioral problems and confusion.     Objective:     Vital Signs (Most Recent):  Temp: 97.7 °F (36.5 °C) (01/10/22 0222)  Pulse: 98 (01/10/22 0222)  Resp: 18 (01/10/22 0222)  BP: 135/74 (01/10/22 0222)  SpO2: 98 % (01/10/22 0222) Vital Signs (24h Range):  Temp:  [97.7 °F (36.5 °C)] 97.7 °F (36.5 °C)  Pulse:  [97-98] 98  Resp:  [18-24] 18  SpO2:  [98 %-100 %] 98 %  BP: (131-135)/(74-85) 135/74     Weight: 73.5 kg (162 lb)  Body mass index is 31.64 kg/m².    Physical Exam  Vitals and nursing note reviewed.   Constitutional:       General: She is not in acute distress.     Appearance: Normal appearance. She is well-developed. She is not toxic-appearing or diaphoretic.   HENT:      Head: Normocephalic and atraumatic.      Nose: Nose normal. No congestion.      Mouth/Throat:      Mouth: Mucous membranes are moist.      Pharynx: No oropharyngeal exudate.      Comments: JVD  Eyes:      General: No scleral icterus.     Pupils: Pupils are equal, round, and reactive to light.   Neck:      Thyroid: No thyromegaly.   Cardiovascular:      Rate and Rhythm: Normal rate and regular rhythm.      Heart sounds: No murmur heard.  No gallop.    Pulmonary:      Effort: Pulmonary effort is normal. No respiratory distress.      Breath sounds: No stridor. Rales present. No wheezing.   Abdominal:      General: There is no distension.      Palpations: Abdomen is soft. There is no mass.      Tenderness: There is no abdominal tenderness. There is no guarding.   Musculoskeletal:         General: No swelling or deformity. Normal range of motion.      Cervical back: Normal range of motion and neck supple. No rigidity.      Right lower leg: Edema present.      Left lower leg: Edema present.   Lymphadenopathy:      Cervical: No cervical adenopathy.   Skin:     General: Skin is warm and dry.      Capillary Refill: Capillary refill takes less than 2 seconds.      Coloration: Skin is not  jaundiced.      Findings: No bruising.   Neurological:      General: No focal deficit present.      Mental Status: She is alert and oriented to person, place, and time.      Cranial Nerves: No cranial nerve deficit.   Psychiatric:         Mood and Affect: Mood normal.         Behavior: Behavior normal.         Thought Content: Thought content normal.           CRANIAL NERVES     CN III, IV, VI   Pupils are equal, round, and reactive to light.           Recent Results (from the past 24 hour(s))   Comprehensive metabolic panel    Collection Time: 01/10/22  1:54 AM   Result Value Ref Range    Sodium 141 136 - 145 mmol/L    Potassium 3.9 3.5 - 5.1 mmol/L    Chloride 105 95 - 110 mmol/L    CO2 20 (L) 23 - 29 mmol/L    Glucose 103 70 - 110 mg/dL    BUN 29 (H) 8 - 23 mg/dL    Creatinine 1.4 0.5 - 1.4 mg/dL    Calcium 8.7 8.7 - 10.5 mg/dL    Total Protein 6.9 6.0 - 8.4 g/dL    Albumin 3.3 (L) 3.5 - 5.2 g/dL    Total Bilirubin 1.9 (H) 0.1 - 1.0 mg/dL    Alkaline Phosphatase 113 55 - 135 U/L    AST 34 10 - 40 U/L    ALT 50 (H) 10 - 44 U/L    Anion Gap 16 8 - 16 mmol/L    eGFR if African American 45 (A) >60 mL/min/1.73 m^2    eGFR if non African American 39 (A) >60 mL/min/1.73 m^2   CBC auto differential    Collection Time: 01/10/22  1:54 AM   Result Value Ref Range    WBC 7.07 3.90 - 12.70 K/uL    RBC 4.37 4.00 - 5.40 M/uL    Hemoglobin 11.7 (L) 12.0 - 16.0 g/dL    Hematocrit 37.6 37.0 - 48.5 %    MCV 86 82 - 98 fL    MCH 26.8 (L) 27.0 - 31.0 pg    MCHC 31.1 (L) 32.0 - 36.0 g/dL    RDW 15.8 (H) 11.5 - 14.5 %    Platelets 257 150 - 450 K/uL    MPV 10.8 9.2 - 12.9 fL    Immature Granulocytes 0.4 0.0 - 0.5 %    Gran # (ANC) 5.5 1.8 - 7.7 K/uL    Immature Grans (Abs) 0.03 0.00 - 0.04 K/uL    Lymph # 1.1 1.0 - 4.8 K/uL    Mono # 0.4 0.3 - 1.0 K/uL    Eos # 0.0 0.0 - 0.5 K/uL    Baso # 0.03 0.00 - 0.20 K/uL    nRBC 0 0 /100 WBC    Gran % 77.6 (H) 38.0 - 73.0 %    Lymph % 15.3 (L) 18.0 - 48.0 %    Mono % 5.9 4.0 - 15.0 %     Eosinophil % 0.4 0.0 - 8.0 %    Basophil % 0.4 0.0 - 1.9 %    Differential Method Automated    Brain natriuretic peptide    Collection Time: 01/10/22  1:54 AM   Result Value Ref Range    BNP 2,309 (H) 0 - 99 pg/mL   D dimer, quantitative    Collection Time: 01/10/22  1:54 AM   Result Value Ref Range    D-Dimer 7.35 (H) <0.50 mg/L FEU   Troponin I    Collection Time: 01/10/22  1:54 AM   Result Value Ref Range    Troponin I <0.006 0.000 - 0.026 ng/mL   TSH    Collection Time: 01/10/22  1:54 AM   Result Value Ref Range    TSH 3.720 0.400 - 4.000 uIU/mL   POCT COVID-19 Rapid Screening    Collection Time: 01/10/22  2:23 AM   Result Value Ref Range    POC Rapid COVID Negative Negative     Acceptable Yes        Microbiology Results (last 7 days)     ** No results found for the last 168 hours. **           Imaging Results          X-Ray Chest AP Portable (Final result)  Result time 01/10/22 00:54:13    Final result by Pete Humphreys MD (01/10/22 00:54:13)                 Impression:      Cardiomegaly with mildly accentuated interstitial lung markings bilaterally which could relate to mild interstitial edema in the appropriate clinical setting.  No new large confluent airspace consolidation appreciated.      Electronically signed by: Pete Humphreys MD  Date:    01/10/2022  Time:    00:54             Narrative:    EXAMINATION:  XR CHEST AP PORTABLE    CLINICAL HISTORY:  shortness of breath;    TECHNIQUE:  Single frontal view of the chest was performed.    COMPARISON:  CTA chest, chest radiograph 11/14/2021    FINDINGS:  Cardiac silhouette is enlarged.  There is atherosclerotic calcification of the thoracic aorta.  There are mildly accentuated interstitial lung markings bilaterally.  No large volume of pleural fluid appreciated radiographically.  No evidence of pneumothorax.  Osseous structures demonstrate degenerative changes.

## 2022-01-11 VITALS
SYSTOLIC BLOOD PRESSURE: 107 MMHG | TEMPERATURE: 97 F | WEIGHT: 156.31 LBS | HEIGHT: 60 IN | DIASTOLIC BLOOD PRESSURE: 71 MMHG | HEART RATE: 95 BPM | RESPIRATION RATE: 18 BRPM | OXYGEN SATURATION: 100 % | BODY MASS INDEX: 30.69 KG/M2

## 2022-01-11 PROBLEM — R79.89 ELEVATED BRAIN NATRIURETIC PEPTIDE (BNP) LEVEL: Status: RESOLVED | Noted: 2022-01-10 | Resolved: 2022-01-11

## 2022-01-11 PROBLEM — I50.9 ACUTE ON CHRONIC CONGESTIVE HEART FAILURE: Status: RESOLVED | Noted: 2021-11-14 | Resolved: 2022-01-11

## 2022-01-11 PROBLEM — I47.20 V-TACH: Status: RESOLVED | Noted: 2022-01-10 | Resolved: 2022-01-11

## 2022-01-11 LAB
ANION GAP SERPL CALC-SCNC: 13 MMOL/L (ref 8–16)
BUN SERPL-MCNC: 33 MG/DL (ref 8–23)
C DIFF GDH STL QL: NEGATIVE
C DIFF TOX A+B STL QL IA: NEGATIVE
CALCIUM SERPL-MCNC: 9 MG/DL (ref 8.7–10.5)
CHLORIDE SERPL-SCNC: 104 MMOL/L (ref 95–110)
CO2 SERPL-SCNC: 25 MMOL/L (ref 23–29)
CREAT SERPL-MCNC: 1.7 MG/DL (ref 0.5–1.4)
E COLI SXT1 STL QL IA: NEGATIVE
E COLI SXT2 STL QL IA: NEGATIVE
EST. GFR  (AFRICAN AMERICAN): 35 ML/MIN/1.73 M^2
EST. GFR  (NON AFRICAN AMERICAN): 31 ML/MIN/1.73 M^2
GLUCOSE SERPL-MCNC: 102 MG/DL (ref 70–110)
POTASSIUM SERPL-SCNC: 4.2 MMOL/L (ref 3.5–5.1)
SODIUM SERPL-SCNC: 142 MMOL/L (ref 136–145)

## 2022-01-11 PROCEDURE — 63600175 PHARM REV CODE 636 W HCPCS: Performed by: STUDENT IN AN ORGANIZED HEALTH CARE EDUCATION/TRAINING PROGRAM

## 2022-01-11 PROCEDURE — 96376 TX/PRO/DX INJ SAME DRUG ADON: CPT

## 2022-01-11 PROCEDURE — 36415 COLL VENOUS BLD VENIPUNCTURE: CPT | Performed by: INTERNAL MEDICINE

## 2022-01-11 PROCEDURE — 25000003 PHARM REV CODE 250: Performed by: INTERNAL MEDICINE

## 2022-01-11 PROCEDURE — 80048 BASIC METABOLIC PNL TOTAL CA: CPT | Performed by: INTERNAL MEDICINE

## 2022-01-11 PROCEDURE — 25000003 PHARM REV CODE 250: Performed by: STUDENT IN AN ORGANIZED HEALTH CARE EDUCATION/TRAINING PROGRAM

## 2022-01-11 PROCEDURE — 99226 PR SUBSEQUENT OBSERVATION CARE,LEVEL III: ICD-10-PCS | Mod: ,,, | Performed by: INTERNAL MEDICINE

## 2022-01-11 PROCEDURE — G0378 HOSPITAL OBSERVATION PER HR: HCPCS

## 2022-01-11 PROCEDURE — 99226 PR SUBSEQUENT OBSERVATION CARE,LEVEL III: CPT | Mod: ,,, | Performed by: INTERNAL MEDICINE

## 2022-01-11 RX ORDER — AMIODARONE HYDROCHLORIDE 400 MG/1
400 TABLET ORAL DAILY
Qty: 14 TABLET | Refills: 0 | Status: SHIPPED | OUTPATIENT
Start: 2022-01-12 | End: 2022-02-09

## 2022-01-11 RX ADMIN — METOPROLOL SUCCINATE 12.5 MG: 25 TABLET, EXTENDED RELEASE ORAL at 09:01

## 2022-01-11 RX ADMIN — LISINOPRIL 20 MG: 20 TABLET ORAL at 09:01

## 2022-01-11 RX ADMIN — AMIODARONE HYDROCHLORIDE 400 MG: 200 TABLET ORAL at 09:01

## 2022-01-11 RX ADMIN — ASPIRIN 81 MG: 81 TABLET, COATED ORAL at 09:01

## 2022-01-11 RX ADMIN — FUROSEMIDE 60 MG: 10 INJECTION, SOLUTION INTRAMUSCULAR; INTRAVENOUS at 03:01

## 2022-01-11 NOTE — NURSING
Patient arrived  to floor via stretcher via transporter from ED. Patient transferred to bed independently.  AAOX4. Patient was oriented to room, information on communication board,and medication regimen.  Bed low, adequate lighting provided, side rails x2 up, call bell within reach.  Admission assessment completed. VSS. cardiac monitor initiated box #[id number].  Patient denied having any acute distress at this time. None observed.  Will continue to monitor and follow treatment plan.

## 2022-01-11 NOTE — PROGRESS NOTES
[facility] Medicine  Progress Note    Patient Name: Steff Jaquez  MRN: [id number]  Patient Class: OP- Observation   Admission Date: 1/10/2022  Length of Stay: 0 days  Attending Physician: Harjit Blanc MD  Primary Care Provider: Jefferson Johnson MD        Subjective:     Principal Problem:Acute on chronic congestive heart failure        HPI:    Steff Jaquez is a 66 y.o. female who  has a past medical history of Hypertension and HFrEF (10%) , presented to the ED with CC of SOB. Patient has been feeling SOB for a couple days now, but often gets SOB with poor heart function. She is lying flat without oxygen. She has a weight diary and is only 2-3 lbs over her dry weight of 160 lbs. Shis waiting for BV placement on 1/28th, but thinks this is too long. She follows with a cardiologist in the [facility]. Recent TTE revealed an EF of 10%, G1 DD, servere LAE, moderate-severe MR, and moderate PRIYA. LHC with Dr. Pedraza was performed on 11/16/2021 and luminal irregularities were seen but no significant coronary artery stenosis was found. D-dimer elevated at 7.4. CTA chest w/o PE but showed with bilateral large pleural effusions and cardiomegaly. Short run of unsustained Vtach in ED. All similar to previous admission. BNP 2300 with neg trop. Started on IV diuresis. CT abdomen showed Heterogeneous, incomplete opacification of the bilateral ovarian/gonadal veins with possible filling defects- this may relate to contrast bolus face timing/contrast mixing, although ovarian vein thrombophlebitis is not excluded. Denied and urinary symptoms although some bladder thickening and fluid surrounding. Patient did admit to having diarrhea/loose stools 3-6x a day that began in November and has persisted, she did not recall getting abx around that time, however she has always defecated once a day prior to this change. Patient denies any fevers, night sweats, vomit, constipation, abd pain, dysuria, hematuria or  hematochezia,  wheezing, CP, HA, dizziness, weakness, hallucinations, SI, HI, or self injury at this time.        Overview/Hospital Course:  Patient placed in observation for acute on chronic systolic heart failure with an EF of 10%.  She was never hypoxic. Improved SOB with lasix.  On day of discharge- she was on RA and would like to go home. Reviewed heart failure information with the patient.  Activity as tolerated. Diet- low NA. Follow up with cards in 1 week.       Interval History: No complaints. On RA     Review of Systems   Constitutional: Negative for activity change, fatigue and fever.   HENT: Negative for sore throat and trouble swallowing.    Eyes: Negative for photophobia and visual disturbance.   Respiratory: Negative for chest tightness and shortness of breath.    Cardiovascular: Negative for chest pain, palpitations and leg swelling.   Gastrointestinal: Negative for abdominal distention, abdominal pain, blood in stool, constipation, diarrhea, nausea and vomiting.   Endocrine: Negative for polydipsia and polyuria.   Genitourinary: Negative for difficulty urinating, dysuria and hematuria.   Musculoskeletal: Negative for neck pain and neck stiffness.   Skin: Negative for pallor and rash.   Allergic/Immunologic: Negative for immunocompromised state.   Neurological: Negative for dizziness, seizures, syncope and facial asymmetry.   Psychiatric/Behavioral: Negative for agitation, behavioral problems and confusion.     Objective:     Vital Signs (Most Recent):  Temp: 97.3 °F (36.3 °C) (01/11/22 0755)  Pulse: 87 (01/11/22 0755)  Resp: 18 (01/11/22 0755)  BP: 114/77 (01/11/22 0755)  SpO2: 98 % (01/11/22 0755) Vital Signs (24h Range):  Temp:  [97.3 °F (36.3 °C)-98.7 °F (37.1 °C)] 97.3 °F (36.3 °C)  Pulse:  [] 87  Resp:  [17-20] 18  SpO2:  [93 %-100 %] 98 %  BP: ()/(63-85) 114/77     Weight: 70.9 kg (156 lb 4.9 oz)  Body mass index is 30.53 kg/m².    Intake/Output Summary (Last 24 hours) at 1/11/2022  0908  Last data filed at 1/11/2022 0830  Gross per 24 hour   Intake 240 ml   Output --   Net 240 ml      Physical Exam  Vitals and nursing note reviewed.   Constitutional:       General: She is not in acute distress.     Appearance: Normal appearance. She is well-developed and normal weight. She is not toxic-appearing or diaphoretic.   HENT:      Head: Normocephalic and atraumatic.      Mouth/Throat:      Comments: JVD  Neck:      Thyroid: No thyromegaly.   Cardiovascular:      Rate and Rhythm: Normal rate and regular rhythm.      Heart sounds: No murmur heard.  No gallop.    Pulmonary:      Effort: Pulmonary effort is normal. No respiratory distress.      Breath sounds: No stridor.   Abdominal:      Palpations: Abdomen is soft.   Musculoskeletal:         General: No swelling or deformity. Normal range of motion.      Cervical back: Normal range of motion and neck supple. No rigidity.      Right lower leg: No edema.      Left lower leg: No edema.   Lymphadenopathy:      Cervical: No cervical adenopathy.   Skin:     General: Skin is warm and dry.      Coloration: Skin is not jaundiced.      Findings: No bruising.   Neurological:      General: No focal deficit present.      Mental Status: She is alert and oriented to person, place, and time.      Cranial Nerves: No cranial nerve deficit.   Psychiatric:         Mood and Affect: Mood normal.         Behavior: Behavior normal.         Thought Content: Thought content normal.         Significant Labs:   All pertinent labs within the past 24 hours have been reviewed.  BMP:   Recent Labs   Lab 01/10/22  0154 01/10/22  1316 01/11/22  0412   GLU   < >  --  102   NA   < >  --  142   K   < >  --  4.2   CL   < >  --  104   CO2   < >  --  25   BUN   < >  --  33*   CREATININE   < >  --  1.7*   CALCIUM   < >  --  9.0   MG  --  1.9  --     < > = values in this interval not displayed.     CBC:   Recent Labs   Lab 01/10/22  0154   WBC 7.07   HGB 11.7*   HCT 37.6           Significant Imagi      Assessment/Plan:      * Acute on chronic congestive heart failure  She has a weight diary and is only 2-3 lbs over her dry weight of 160 lbs.   Shis waiting for BV placement on 1/28th  [place]   Recent TTE revealed an EF of 10%, G1 DD, servere LAE, moderate-severe MR, and moderate PRIYA.   LHC with Dr. Pedraza was performed on 11/16/2021 and luminal irregularities were seen but        no significant coronary artery stenosis was found.  -BNP 2300 with neg trop.   -Started on IV diuresis, will prob only need 1 day IV diuresis    Resolved. On RA   Will d/c to home         Diarrhea  Chronic diarrhea >2m  3-6x a day, baseline once a day  -Will check for Cdiff and other infectious signs      V-tach  Short run of unsustained Vtach in ED, did this last admission as well  -Tele  -electrolyte repletion       Elevated brain natriuretic peptide (BNP) level  BNP 2900, see ACHF note      Volume overload        HFrEF (heart failure with reduced ejection fraction)  EF 10 %, see ACHF note      Cardiomegaly        Pleural effusion  -IV diuresis, stable from previous    HTN (hypertension)  Continue low dose metop and lisin        VTE Risk Mitigation (From admission, onward)         Ordered     enoxaparin injection 40 mg  Daily         01/10/22 0833     IP VTE HIGH RISK PATIENT  Once         01/10/22 0338     Place sequential compression device  Until discontinued         01/10/22 0338                Discharge Planning   SHIRA:      Code Status: Full Code   Is the patient medically ready for discharge?:     Reason for patient still in hospital (select all that apply): Patient unstable  Discharge Plan A: Home with family (with follow up)        Will d/c to home           Harjit Brasher MD  Department of Hospital Medicine   [facility] - Med Surg

## 2022-01-11 NOTE — PROGRESS NOTES
Clinical printed and sent to [employer] via fax for review to obtain LifeVest for pt.    [id number]- message sent to Alanis with [employer] to advise of order and clinicals faxed for review. [place] to follow for response.

## 2022-01-11 NOTE — ASSESSMENT & PLAN NOTE
She has a weight diary and is only 2-3 lbs over her dry weight of 160 lbs.   Shis waiting for BV placement on 1/28th  [place]   Recent TTE revealed an EF of 10%, G1 DD, servere LAE, moderate-severe MR, and moderate PRIYA.   LHC with Dr. Pedraza was performed on 11/16/2021 and luminal irregularities were seen but        no significant coronary artery stenosis was found.  -BNP 2300 with neg trop.   -Started on IV diuresis, will prob only need 1 day IV diuresis    Resolved. On RA   Will d/c to home

## 2022-01-11 NOTE — SUBJECTIVE & OBJECTIVE
Interval History: No complaints. On RA     Review of Systems   Constitutional: Negative for activity change, fatigue and fever.   HENT: Negative for sore throat and trouble swallowing.    Eyes: Negative for photophobia and visual disturbance.   Respiratory: Negative for chest tightness and shortness of breath.    Cardiovascular: Negative for chest pain, palpitations and leg swelling.   Gastrointestinal: Negative for abdominal distention, abdominal pain, blood in stool, constipation, diarrhea, nausea and vomiting.   Endocrine: Negative for polydipsia and polyuria.   Genitourinary: Negative for difficulty urinating, dysuria and hematuria.   Musculoskeletal: Negative for neck pain and neck stiffness.   Skin: Negative for pallor and rash.   Allergic/Immunologic: Negative for immunocompromised state.   Neurological: Negative for dizziness, seizures, syncope and facial asymmetry.   Psychiatric/Behavioral: Negative for agitation, behavioral problems and confusion.     Objective:     Vital Signs (Most Recent):  Temp: 97.3 °F (36.3 °C) (01/11/22 0755)  Pulse: 87 (01/11/22 0755)  Resp: 18 (01/11/22 0755)  BP: 114/77 (01/11/22 0755)  SpO2: 98 % (01/11/22 0755) Vital Signs (24h Range):  Temp:  [97.3 °F (36.3 °C)-98.7 °F (37.1 °C)] 97.3 °F (36.3 °C)  Pulse:  [] 87  Resp:  [17-20] 18  SpO2:  [93 %-100 %] 98 %  BP: ()/(63-85) 114/77     Weight: 70.9 kg (156 lb 4.9 oz)  Body mass index is 30.53 kg/m².    Intake/Output Summary (Last 24 hours) at 1/11/2022 0908  Last data filed at 1/11/2022 0830  Gross per 24 hour   Intake 240 ml   Output --   Net 240 ml      Physical Exam  Vitals and nursing note reviewed.   Constitutional:       General: She is not in acute distress.     Appearance: Normal appearance. She is well-developed and normal weight. She is not toxic-appearing or diaphoretic.   HENT:      Head: Normocephalic and atraumatic.      Mouth/Throat:      Comments: JVD  Neck:      Thyroid: No thyromegaly.    Cardiovascular:      Rate and Rhythm: Normal rate and regular rhythm.      Heart sounds: No murmur heard.  No gallop.    Pulmonary:      Effort: Pulmonary effort is normal. No respiratory distress.      Breath sounds: No stridor.   Abdominal:      Palpations: Abdomen is soft.   Musculoskeletal:         General: No swelling or deformity. Normal range of motion.      Cervical back: Normal range of motion and neck supple. No rigidity.      Right lower leg: No edema.      Left lower leg: No edema.   Lymphadenopathy:      Cervical: No cervical adenopathy.   Skin:     General: Skin is warm and dry.      Coloration: Skin is not jaundiced.      Findings: No bruising.   Neurological:      General: No focal deficit present.      Mental Status: She is alert and oriented to person, place, and time.      Cranial Nerves: No cranial nerve deficit.   Psychiatric:         Mood and Affect: Mood normal.         Behavior: Behavior normal.         Thought Content: Thought content normal.         Significant Labs:   All pertinent labs within the past 24 hours have been reviewed.  BMP:   Recent Labs   Lab 01/10/22  0154 01/10/22  1316 01/11/22  0412   GLU   < >  --  102   NA   < >  --  142   K   < >  --  4.2   CL   < >  --  104   CO2   < >  --  25   BUN   < >  --  33*   CREATININE   < >  --  1.7*   CALCIUM   < >  --  9.0   MG  --  1.9  --     < > = values in this interval not displayed.     CBC:   Recent Labs   Lab 01/10/22  0154   WBC 7.07   HGB 11.7*   HCT 37.6          Significant Imagi

## 2022-01-11 NOTE — ASSESSMENT & PLAN NOTE
Feeling better after IV diuresis.  Continue guideline directed medical therapy  Lasix as needed.  Currently sob is better. Multiple episodes of NSVTs noted on tele, all self-limiting about 7-8 minutes.  Total 3-4 episodes over the past 24 hours.  LifeVest was ordered yesterday. Amiodarone 400 mg bid  X 12 days followed by 200 mg daily.

## 2022-01-11 NOTE — HOSPITAL COURSE
Patient placed in observation for acute on chronic systolic heart failure with an EF of 10%.  She was never hypoxic. Improved SOB with lasix.  On day of discharge- she was on RA and would like to go home. Reviewed heart failure information with the patient.  Activity as tolerated. Diet- low NA. Follow up with cards in 1 week.

## 2022-01-11 NOTE — PLAN OF CARE
Pt free from falls, injury or any further trauma throughout shift. Pt AAO x . Continued medications as ordered. Pt on RA, BP monitor closely throughout shift. Pt in no distress. Call light in reach, bed locked in lowest position. Will cont to monitor.      Problem: Adult Inpatient Plan of Care  Goal: Plan of Care Review  Outcome: Ongoing, Progressing     Problem: Heart Failure Comorbidity  Goal: Maintenance of Heart Failure Symptom Control  Outcome: Ongoing, Progressing     Problem: Infection  Goal: Absence of Infection Signs and Symptoms  Outcome: Ongoing, Progressing

## 2022-01-11 NOTE — NURSING
"Patient discharged per MD order.  IV removed.  Catheter tip intact.  No distress noted.  Discharge instructions explained.  AVS given to patient    Patient verbalized understanding.  VSS.  Afebrile.  No complaints of pain, N/V, diarrhea or SOB.  Pt informed where to  Rx .  Patient states "my ride will be here until 5 OR 6 and I don't have a way to get inside b/c she has the key"  "

## 2022-01-11 NOTE — PROGRESS NOTES
[facility]  Cardiology  Progress Note    Patient Name: Steff Jaquez  MRN: [id number]  Admission Date: 1/10/2022  Hospital Length of Stay: 0 days  Code Status: Full Code   Attending Physician: Harjit Blanc MD   Primary Care Physician: Jefferson Johnson MD  Expected Discharge Date: 1/11/2022  Principal Problem:Acute on chronic congestive heart failure    Subjective:       Interval History: feeling better.     Review of Systems   Constitutional: Negative.   HENT: Negative.    Eyes: Negative.    Cardiovascular: Positive for dyspnea on exertion.   Respiratory: Positive for shortness of breath.    Endocrine: Negative.    Hematologic/Lymphatic: Negative.    Skin: Negative.    Musculoskeletal: Negative.    Gastrointestinal: Negative.    Genitourinary: Negative.    Neurological: Negative.    Psychiatric/Behavioral: Negative.    Allergic/Immunologic: Negative.      Objective:     Vital Signs (Most Recent):  Temp: 97.3 °F (36.3 °C) (01/11/22 1130)  Pulse: 95 (01/11/22 1130)  Resp: 18 (01/11/22 1130)  BP: 107/71 (01/11/22 1130)  SpO2: 100 % (01/11/22 1130) Vital Signs (24h Range):  Temp:  [97.3 °F (36.3 °C)-98.7 °F (37.1 °C)] 97.3 °F (36.3 °C)  Pulse:  [] 95  Resp:  [17-20] 18  SpO2:  [96 %-100 %] 100 %  BP: ()/(63-83) 107/71     Weight: 70.9 kg (156 lb 4.9 oz)  Body mass index is 30.53 kg/m².     SpO2: 100 %  O2 Device (Oxygen Therapy): room air      Intake/Output Summary (Last 24 hours) at 1/11/2022 1234  Last data filed at 1/11/2022 0830  Gross per 24 hour   Intake 240 ml   Output --   Net 240 ml       Lines/Drains/Airways     Peripheral Intravenous Line                 Peripheral IV - Single Lumen 01/10/22 0200 20 G Right Antecubital 1 day         Peripheral IV - Single Lumen 01/10/22 0330 20 G Left Antecubital 1 day                Physical Exam  Constitutional:       Appearance: Normal appearance. She is well-developed.   HENT:      Head: Normocephalic.   Eyes:      Pupils: Pupils are equal,  round, and reactive to light.   Cardiovascular:      Rate and Rhythm: Normal rate and regular rhythm.   Pulmonary:      Effort: Pulmonary effort is normal.   Abdominal:      General: Bowel sounds are normal.      Palpations: Abdomen is soft.      Tenderness: There is no abdominal tenderness.   Musculoskeletal:         General: Normal range of motion.      Cervical back: Normal range of motion and neck supple.   Skin:     General: Skin is warm.   Neurological:      Mental Status: She is alert and oriented to person, place, and time.         Significant Labs:   BMP:   Recent Labs   Lab 01/10/22  0154 01/10/22  1316 01/11/22  0412     --  102     --  142   K 3.9  --  4.2     --  104   CO2 20*  --  25   BUN 29*  --  33*   CREATININE 1.4  --  1.7*   CALCIUM 8.7  --  9.0   MG  --  1.9  --    , CMP   Recent Labs   Lab 01/10/22  0154 01/11/22  0412    142   K 3.9 4.2    104   CO2 20* 25    102   BUN 29* 33*   CREATININE 1.4 1.7*   CALCIUM 8.7 9.0   PROT 6.9  --    ALBUMIN 3.3*  --    BILITOT 1.9*  --    ALKPHOS 113  --    AST 34  --    ALT 50*  --    ANIONGAP 16 13   ESTGFRAFRICA 45* 35*   EGFRNONAA 39* 31*   , CBC   Recent Labs   Lab 01/10/22  0154   WBC 7.07   HGB 11.7*   HCT 37.6      , INR No results for input(s): INR, PROTIME in the last 48 hours., Lipid Panel No results for input(s): CHOL, HDL, LDLCALC, TRIG, CHOLHDL in the last 48 hours., Troponin   Recent Labs   Lab 01/10/22  0154   TROPONINI <0.006    and All pertinent lab results from the last 24 hours have been reviewed.    Significant Imaging: Echocardiogram:   Transthoracic echo (TTE) complete (Cupid Only):   Results for orders placed or performed during the hospital encounter of 01/10/22   Echo   Result Value Ref Range    BSA 1.76 m2    TDI SEPTAL 0.02 m/s    LV LATERAL E/E' RATIO 13.25 m/s    LV SEPTAL E/E' RATIO 53.00 m/s    LA WIDTH 5.14 cm    TDI LATERAL 0.08 m/s    PV PEAK VELOCITY 0.76 cm/s    LVIDd 6.41 (A)  3.5 - 6.0 cm    IVS 0.59 (A) 0.6 - 1.1 cm    Posterior Wall 1.05 0.6 - 1.1 cm    LVIDs 6.11 (A) 2.1 - 4.0 cm    FS 5 28 - 44 %    LA volume 143.19 cm3    Sinus 2.42 cm    STJ 2.24 cm    LV mass 215.49 g    LA size 4.85 cm    RVDD 4.57 cm    TAPSE 1.29 cm    Left Ventricle Relative Wall Thickness 0.33 cm    AV mean gradient 2 mmHg    AV valve area 0.96 cm2    AV Velocity Ratio 0.58     AV index (prosthetic) 0.55     MV valve area p 1/2 method 5.71 cm2    E/A ratio 2.16     Mean e' 0.05 m/s    E wave deceleration time 132.98 msec    IVRT 86.51 msec    LVOT diameter 1.49 cm    LVOT area 1.7 cm2    LVOT peak shaun 0.54 m/s    LVOT peak VTI 6.73 cm    Ao peak shaun 0.93 m/s    Ao VTI 12.25 cm    LVOT stroke volume 11.73 cm3    AV peak gradient 3 mmHg    E/E' ratio 21.20 m/s    MV Peak E Shaun 1.06 m/s    TR Max Shaun 3.59 m/s    MV stenosis pressure 1/2 time 38.56 ms    MV Peak A Shaun 0.49 m/s    LV Systolic Volume 187.35 mL    LV Systolic Volume Index 109.6 mL/m2    LV Diastolic Volume 209.49 mL    LV Diastolic Volume Index 122.51 mL/m2    LA Volume Index 83.7 mL/m2    LV Mass Index 126 g/m2    RA Major Axis 6.32 cm    Left Atrium Minor Axis 6.54 cm    Left Atrium Major Axis 6.99 cm    Triscuspid Valve Regurgitation Peak Gradient 52 mmHg    RA Width 6.57 cm    Right Atrial Pressure (from IVC) 15 mmHg    EF 10 %    TV rest pulmonary artery pressure 67 mmHg    Narrative    · The estimated ejection fraction is 10%.  · The left ventricle is moderately enlarged with severely decreased   systolic function.  · Grade III left ventricular diastolic dysfunction.  · Normal right ventricular size with normal right ventricular systolic   function.  · Mild pulmonic regurgitation.  · Severe left atrial enlargement.  · Severe right atrial enlargement.  · Moderate-to-severe mitral regurgitation.  · Moderate tricuspid regurgitation.  · Elevated central venous pressure (15 mmHg).  · The estimated PA systolic pressure is 67 mmHg.  · There is  pulmonary hypertension.        Assessment and Plan:     Brief HPI:     Diarrhea        HFrEF (heart failure with reduced ejection fraction)  Feeling better after IV diuresis.  Continue guideline directed medical therapy  Lasix as needed.  Currently sob is better. Multiple episodes of NSVTs noted on tele, all self-limiting about 7-8 minutes.  Total 3-4 episodes over the past 24 hours.  LifeVest was ordered yesterday. Amiodarone 400 mg bid  X 12 days followed by 200 mg daily.     Pleural effusion        HTN (hypertension)              VTE Risk Mitigation (From admission, onward)         Ordered     enoxaparin injection 40 mg  Daily         01/10/22 0833     IP VTE HIGH RISK PATIENT  Once         01/10/22 0338     Place sequential compression device  Until discontinued         01/10/22 0338                Mingo Pedraza MD  Cardiology  [facility]

## 2022-01-11 NOTE — PLAN OF CARE
01/11/22 1216   Final Note   Assessment Type Final Discharge Note   Anticipated Discharge Disposition Home   Hospital Resources/Appts/Education Provided Appointments scheduled and added to AVS;Provided education on problems/symptoms using teachback   Post-Acute Status   Post-Acute Authorization Other   Other Status No Post-Acute Service Needs   Pts nurse Mitra notified that the pt can d/c from CM standpoint once lifevest delivered

## 2022-01-11 NOTE — PROGRESS NOTES
Message received from Alanis [facility] stating that the pt has been approved for life vest and representative will be out to hospital to fit pt within the hour

## 2022-01-11 NOTE — PROGRESS NOTES
WRITTEN HEALTHCARE DISCHARGE INFORMATION      Things that YOU are RESPONSIBLE for to Manage Your Care At Home:     1. Getting your prescriptions filled.  2. Taking you medications as directed. DO NOT MISS ANY DOSES!  3. Going to your follow-up doctor appointments. This is important because it allows the doctor to monitor your progress and to determine if any changes need to be made to your treatment plan.     If you are unable to make your follow up appointments, please call the number listed and reschedule this appointment.      ____________HELP AT HOME____________________     Experiencing any SIGNS or SYMPTOMS: YOU CAN     Schedule a same day appopintment with your Primary Care Doctor or  you can call [facility] On Call Nurse Care Line for 24/7 assistance at [phone]     If you are experience any signs or symptoms that have become severe, Call 911 and come to your nearest Emergency Room.     Thank you for choosing [facility] and allowing us to care for you.   From your care management team:      You should receive a call from [facility] Discharge Department within 48-72 hours to help manage your care after discharge. Please try to make sure that you answer your phone for this important phone call.       Follow-up Information     Jefferson Johnson MD.    Specialties: Cardiology, INTERVENTIONAL CARDIOLOGY  Why: please call to schedule follow up appointment with PCP as needed post hospital discharge  Contact information:  [address]  [address]  [facility] [address]  [phone]             Mingo Pedraza MD. Go on 1/27/2022.    Specialties: Cardiology, INTERVENTIONAL CARDIOLOGY  Why: Please arrive to your schduled appointment at 8:40am  Contact information:  [address]  [address]  [facility] [address]  [phone]             [employer] life vet.    Why: call for questions or concerns regarding life vest  Contact information:  [phone]

## 2022-01-11 NOTE — DISCHARGE SUMMARY
[facility]  Discharge Summary      Patient Name: Steff Jaquez  MRN: [id number]  Patient Class: OP- Observation  Admission Date: 1/10/2022  Hospital Length of Stay: 0 days  Discharge Date and Time:  01/11/2022 9:12 AM  Attending Physician: Harjit Blanc MD   Discharging Provider: Harjit Blanc MD  Primary Care Provider: Jefferson Johnson MD      HPI:     Steff Jaquez is a 66 y.o. female who  has a past medical history of Hypertension and HFrEF (10%) , presented to the ED with CC of SOB. Patient has been feeling SOB for a couple days now, but often gets SOB with poor heart function. She is lying flat without oxygen. She has a weight diary and is only 2-3 lbs over her dry weight of 160 lbs. Shis waiting for BV placement on 1/28th, but thinks this is too long. She follows with a cardiologist in the [facility]. Recent TTE revealed an EF of 10%, G1 DD, servere LAE, moderate-severe MR, and moderate PRIYA. LHC with Dr. Pedraza was performed on 11/16/2021 and luminal irregularities were seen but no significant coronary artery stenosis was found. D-dimer elevated at 7.4. CTA chest w/o PE but showed with bilateral large pleural effusions and cardiomegaly. Short run of unsustained Vtach in ED. All similar to previous admission. BNP 2300 with neg trop. Started on IV diuresis. CT abdomen showed Heterogeneous, incomplete opacification of the bilateral ovarian/gonadal veins with possible filling defects- this may relate to contrast bolus face timing/contrast mixing, although ovarian vein thrombophlebitis is not excluded. Denied and urinary symptoms although some bladder thickening and fluid surrounding. Patient did admit to having diarrhea/loose stools 3-6x a day that began in November and has persisted, she did not recall getting abx around that time, however she has always defecated once a day prior to this change. Patient denies any fevers, night sweats, vomit, constipation, abd pain, dysuria,  hematuria or hematochezia,  wheezing, CP, HA, dizziness, weakness, hallucinations, SI, HI, or self injury at this time.        * No surgery found *      Hospital Course:   Patient placed in observation for acute on chronic systolic heart failure with an EF of 10%.  She was never hypoxic. Improved SOB with lasix.  On day of discharge- she was on RA and would like to go home. Reviewed heart failure information with the patient.  Activity as tolerated. Diet- low NA. Follow up with cards in 1 week.        Goals of Care Treatment Preferences:  Code Status: Full Code      Consults:   Consults (From admission, onward)        Status Ordering Provider     Inpatient consult to Social Work/Case Management  Once        Provider:  (Not yet assigned)    MINGO Edouard     Inpatient consult to Cardiology  Once        Provider:  Mingo Pedraza MD    Completed JUNI LEE new Assessment & Plan notes have been filed under this hospital service since the last note was generated.  Service: Hospital Medicine    Final Active Diagnoses:    Diagnosis Date Noted POA    HFrEF (heart failure with reduced ejection fraction) [I50.20] 01/10/2022 Yes    Diarrhea [R19.7] 01/10/2022 Yes    HTN (hypertension) [I10] 11/14/2021 Yes    Pleural effusion [J90] 11/14/2021 Yes      Problems Resolved During this Admission:    Diagnosis Date Noted Date Resolved POA    PRINCIPAL PROBLEM:  Acute on chronic congestive heart failure [I50.9] 11/14/2021 01/11/2022 Yes    Elevated brain natriuretic peptide (BNP) level [R79.89] 01/10/2022 01/11/2022 Yes    V-tach [I47.2] 01/10/2022 01/11/2022 Yes       Discharged Condition: good    Disposition: Home or Self Care    Follow Up:   Follow-up Information     Jefferson Johnson MD.    Specialties: Cardiology, INTERVENTIONAL CARDIOLOGY  Why: please call to schedule follow up appointment with PCP as needed post hospital discharge  Contact information:  [address]  [address]  [place]  [facility] [address]  [phone]             Jefferson Johnson MD In 1 week.    Specialties: Cardiology, INTERVENTIONAL CARDIOLOGY  Contact information:  [address]  [address]  [facility] [address]  [phone]             Mingo Pedraza MD In 1 week.    Specialties: Cardiology, INTERVENTIONAL CARDIOLOGY  Contact information:  [address]  [address]  [place] [address]  [phone]                       Patient Instructions:   No discharge procedures on file.    Significant Diagnostic Studies:     Pending Diagnostic Studies:     Procedure Component Value Units Date/Time    COVID-19 Routine Screening [[id number]] Collected: 01/10/22 0218    Order Status: Sent Lab Status: In process Updated: 01/10/22 0218    Specimen: Nasopharyngeal     Calprotectin, Stool [[id number]] Collected: 01/10/22 0942    Order Status: Sent Lab Status: In process Updated: 01/10/22 0954    Specimen: Stool     Fecal fat, qualitative [[id number]] Collected: 01/10/22 0942    Order Status: Sent Lab Status: In process Updated: 01/10/22 0949    Specimen: Stool     Giardia / Cryptosporidum, EIA [[id number]] Collected: 01/10/22 0942    Order Status: Sent Lab Status: In process Updated: 01/10/22 1622    Specimen: Stool     Pancreatic elastase, fecal [[id number]] Collected: 01/10/22 0942    Order Status: Sent Lab Status: In process Updated: 01/10/22 0951    Specimen: Stool     Stool Exam-Ova,Cysts,Parasites [[id number]] Collected: 01/10/22 0942    Order Status: Sent Lab Status: In process Updated: 01/10/22 0953    Specimen: Stool          Medications:  Reconciled Home Medications:      Medication List      START taking these medications    amiodarone 400 MG tablet  Commonly known as: PACERONE  Take 1 tablet (400 mg total) by mouth once daily. for 14 days  Start taking on: January 12, 2022        CONTINUE taking these medications    aspirin 81 MG EC tablet  Commonly known as: ECOTRIN  Take 81 mg by mouth once daily.      co-enzyme Q-10 30 mg capsule  Take 30 mg by mouth 3 (three) times daily.     ELDERBERRY FRUIT ORAL  Take by mouth.     fish oil-omega-3 fatty acids 300-1,000 mg capsule  1 capsule once daily.     furosemide 20 MG tablet  Commonly known as: LASIX  Take 1 tablet (20 mg total) by mouth once daily.     lisinopriL 10 MG tablet  Take 20 mg by mouth once daily.     metoprolol succinate 25 MG 24 hr tablet  Commonly known as: TOPROL-XL  Take 0.5 tablets (12.5 mg total) by mouth once daily.     VITAMIN C ORAL  Take by mouth.     vitamin D 1000 units Tab  Commonly known as: VITAMIN D3  Take 1,000 Units by mouth once daily.            Indwelling Lines/Drains at time of discharge:   Lines/Drains/Airways     None                 Time spent on the discharge of patient > 35 minutes         Harjit Brasher MD  Department of Hospital Medicine  [facility]

## 2022-01-11 NOTE — SUBJECTIVE & OBJECTIVE
Interval History: feeling better.     Review of Systems   Constitutional: Negative.   HENT: Negative.    Eyes: Negative.    Cardiovascular: Positive for dyspnea on exertion.   Respiratory: Positive for shortness of breath.    Endocrine: Negative.    Hematologic/Lymphatic: Negative.    Skin: Negative.    Musculoskeletal: Negative.    Gastrointestinal: Negative.    Genitourinary: Negative.    Neurological: Negative.    Psychiatric/Behavioral: Negative.    Allergic/Immunologic: Negative.      Objective:     Vital Signs (Most Recent):  Temp: 97.3 °F (36.3 °C) (01/11/22 1130)  Pulse: 95 (01/11/22 1130)  Resp: 18 (01/11/22 1130)  BP: 107/71 (01/11/22 1130)  SpO2: 100 % (01/11/22 1130) Vital Signs (24h Range):  Temp:  [97.3 °F (36.3 °C)-98.7 °F (37.1 °C)] 97.3 °F (36.3 °C)  Pulse:  [] 95  Resp:  [17-20] 18  SpO2:  [96 %-100 %] 100 %  BP: ()/(63-83) 107/71     Weight: 70.9 kg (156 lb 4.9 oz)  Body mass index is 30.53 kg/m².     SpO2: 100 %  O2 Device (Oxygen Therapy): room air      Intake/Output Summary (Last 24 hours) at 1/11/2022 1234  Last data filed at 1/11/2022 0830  Gross per 24 hour   Intake 240 ml   Output --   Net 240 ml       Lines/Drains/Airways     Peripheral Intravenous Line                 Peripheral IV - Single Lumen 01/10/22 0200 20 G Right Antecubital 1 day         Peripheral IV - Single Lumen 01/10/22 0330 20 G Left Antecubital 1 day                Physical Exam  Constitutional:       Appearance: Normal appearance. She is well-developed.   HENT:      Head: Normocephalic.   Eyes:      Pupils: Pupils are equal, round, and reactive to light.   Cardiovascular:      Rate and Rhythm: Normal rate and regular rhythm.   Pulmonary:      Effort: Pulmonary effort is normal.   Abdominal:      General: Bowel sounds are normal.      Palpations: Abdomen is soft.      Tenderness: There is no abdominal tenderness.   Musculoskeletal:         General: Normal range of motion.      Cervical back: Normal range of  motion and neck supple.   Skin:     General: Skin is warm.   Neurological:      Mental Status: She is alert and oriented to person, place, and time.         Significant Labs:   BMP:   Recent Labs   Lab 01/10/22  0154 01/10/22  1316 01/11/22 0412     --  102     --  142   K 3.9  --  4.2     --  104   CO2 20*  --  25   BUN 29*  --  33*   CREATININE 1.4  --  1.7*   CALCIUM 8.7  --  9.0   MG  --  1.9  --    , CMP   Recent Labs   Lab 01/10/22  0154 01/11/22  0412    142   K 3.9 4.2    104   CO2 20* 25    102   BUN 29* 33*   CREATININE 1.4 1.7*   CALCIUM 8.7 9.0   PROT 6.9  --    ALBUMIN 3.3*  --    BILITOT 1.9*  --    ALKPHOS 113  --    AST 34  --    ALT 50*  --    ANIONGAP 16 13   ESTGFRAFRICA 45* 35*   EGFRNONAA 39* 31*   , CBC   Recent Labs   Lab 01/10/22  0154   WBC 7.07   HGB 11.7*   HCT 37.6      , INR No results for input(s): INR, PROTIME in the last 48 hours., Lipid Panel No results for input(s): CHOL, HDL, LDLCALC, TRIG, CHOLHDL in the last 48 hours., Troponin   Recent Labs   Lab 01/10/22  0154   TROPONINI <0.006    and All pertinent lab results from the last 24 hours have been reviewed.    Significant Imaging: Echocardiogram:   Transthoracic echo (TTE) complete (Cupid Only):   Results for orders placed or performed during the hospital encounter of 01/10/22   Echo   Result Value Ref Range    BSA 1.76 m2    TDI SEPTAL 0.02 m/s    LV LATERAL E/E' RATIO 13.25 m/s    LV SEPTAL E/E' RATIO 53.00 m/s    LA WIDTH 5.14 cm    TDI LATERAL 0.08 m/s    PV PEAK VELOCITY 0.76 cm/s    LVIDd 6.41 (A) 3.5 - 6.0 cm    IVS 0.59 (A) 0.6 - 1.1 cm    Posterior Wall 1.05 0.6 - 1.1 cm    LVIDs 6.11 (A) 2.1 - 4.0 cm    FS 5 28 - 44 %    LA volume 143.19 cm3    Sinus 2.42 cm    STJ 2.24 cm    LV mass 215.49 g    LA size 4.85 cm    RVDD 4.57 cm    TAPSE 1.29 cm    Left Ventricle Relative Wall Thickness 0.33 cm    AV mean gradient 2 mmHg    AV valve area 0.96 cm2    AV Velocity Ratio 0.58     AV  index (prosthetic) 0.55     MV valve area p 1/2 method 5.71 cm2    E/A ratio 2.16     Mean e' 0.05 m/s    E wave deceleration time 132.98 msec    IVRT 86.51 msec    LVOT diameter 1.49 cm    LVOT area 1.7 cm2    LVOT peak shaun 0.54 m/s    LVOT peak VTI 6.73 cm    Ao peak shaun 0.93 m/s    Ao VTI 12.25 cm    LVOT stroke volume 11.73 cm3    AV peak gradient 3 mmHg    E/E' ratio 21.20 m/s    MV Peak E Shaun 1.06 m/s    TR Max Shaun 3.59 m/s    MV stenosis pressure 1/2 time 38.56 ms    MV Peak A Shaun 0.49 m/s    LV Systolic Volume 187.35 mL    LV Systolic Volume Index 109.6 mL/m2    LV Diastolic Volume 209.49 mL    LV Diastolic Volume Index 122.51 mL/m2    LA Volume Index 83.7 mL/m2    LV Mass Index 126 g/m2    RA Major Axis 6.32 cm    Left Atrium Minor Axis 6.54 cm    Left Atrium Major Axis 6.99 cm    Triscuspid Valve Regurgitation Peak Gradient 52 mmHg    RA Width 6.57 cm    Right Atrial Pressure (from IVC) 15 mmHg    EF 10 %    TV rest pulmonary artery pressure 67 mmHg    Narrative    · The estimated ejection fraction is 10%.  · The left ventricle is moderately enlarged with severely decreased   systolic function.  · Grade III left ventricular diastolic dysfunction.  · Normal right ventricular size with normal right ventricular systolic   function.  · Mild pulmonic regurgitation.  · Severe left atrial enlargement.  · Severe right atrial enlargement.  · Moderate-to-severe mitral regurgitation.  · Moderate tricuspid regurgitation.  · Elevated central venous pressure (15 mmHg).  · The estimated PA systolic pressure is 67 mmHg.  · There is pulmonary hypertension.

## 2022-01-12 LAB
CRYPTOSP AG STL QL IA: NEGATIVE
ELASTASE 1, FECAL: >500 MCG/G
G LAMBLIA AG STL QL IA: NEGATIVE
O+P STL MICRO: NORMAL

## 2022-01-12 NOTE — NURSING
Pt assisted to w/c without injury,NADN,pt now leaving unit with life vest on and life vest equipment.d/c home.

## 2022-01-12 NOTE — NURSING
"Pt asked several time by this nurse what time her ride is getting here she states "my daughter is downstairs now they took longer to bring up the dinner" pt informed she has been d/c since earlier today and she did not have to wait on dinner she then states "I know my daughter couldn't get here until 6" transport requested.  "

## 2022-01-13 LAB
BACTERIA STL CULT: NORMAL
CALPROTECTIN STL-MCNT: 38.2 MCG/G

## 2022-01-15 LAB
H PYLORI AG STL QL IA: ABNORMAL
SPECIMEN SOURCE: 1

## 2022-01-17 LAB
FAT STL QL: NORMAL
NEUTRAL FAT STL QL: NORMAL

## 2022-01-26 ENCOUNTER — HOSPITAL ENCOUNTER (OUTPATIENT)
Dept: PREADMISSION TESTING | Facility: HOSPITAL | Age: 67
Discharge: HOME | End: 2022-01-26
Attending: INTERNAL MEDICINE
Payer: MEDICARE

## 2022-01-26 VITALS — BODY MASS INDEX: 31.41 KG/M2 | HEIGHT: 60 IN | WEIGHT: 160 LBS

## 2022-01-26 DIAGNOSIS — Z01.810 PREOP CARDIOVASCULAR EXAM: Primary | ICD-10-CM

## 2022-01-26 LAB
ALBUMIN SERPL BCP-MCNC: 3.3 G/DL (ref 3.5–5.2)
ALP SERPL-CCNC: 97 U/L (ref 55–135)
ALT SERPL W/O P-5'-P-CCNC: 46 U/L (ref 10–44)
ANION GAP SERPL CALC-SCNC: 12 MMOL/L (ref 8–16)
APTT PPP: 32 SEC (ref 23.3–35.1)
AST SERPL-CCNC: 28 U/L (ref 10–40)
BASOPHILS # BLD AUTO: 0.04 K/UL (ref 0–0.2)
BASOPHILS NFR BLD: 0.6 % (ref 0–1.9)
BILIRUB SERPL-MCNC: 2 MG/DL (ref 0.1–1)
BUN SERPL-MCNC: 31 MG/DL (ref 8–23)
CALCIUM SERPL-MCNC: 9 MG/DL (ref 8.7–10.5)
CHLORIDE SERPL-SCNC: 100 MMOL/L (ref 95–110)
CO2 SERPL-SCNC: 24 MMOL/L (ref 23–29)
CREAT SERPL-MCNC: 1.6 MG/DL (ref 0.5–1.4)
DIFFERENTIAL METHOD: ABNORMAL
EOSINOPHIL # BLD AUTO: 0 K/UL (ref 0–0.5)
EOSINOPHIL NFR BLD: 0.6 % (ref 0–8)
ERYTHROCYTE [DISTWIDTH] IN BLOOD BY AUTOMATED COUNT: 15.6 % (ref 11.5–14.5)
EST. GFR  (AFRICAN AMERICAN): 38.2 ML/MIN/1.73 M^2
EST. GFR  (NON AFRICAN AMERICAN): 33.1 ML/MIN/1.73 M^2
GLUCOSE SERPL-MCNC: 105 MG/DL (ref 70–110)
HCT VFR BLD AUTO: 39.8 % (ref 37–48.5)
HGB BLD-MCNC: 12.2 G/DL (ref 12–16)
IMM GRANULOCYTES # BLD AUTO: 0.02 K/UL (ref 0–0.04)
IMM GRANULOCYTES NFR BLD AUTO: 0.3 % (ref 0–0.5)
INR PPP: 1.3
LYMPHOCYTES # BLD AUTO: 0.9 K/UL (ref 1–4.8)
LYMPHOCYTES NFR BLD: 13.2 % (ref 18–48)
MAGNESIUM SERPL-MCNC: 2 MG/DL (ref 1.6–2.6)
MCH RBC QN AUTO: 25.4 PG (ref 27–31)
MCHC RBC AUTO-ENTMCNC: 30.7 G/DL (ref 32–36)
MCV RBC AUTO: 83 FL (ref 82–98)
MONOCYTES # BLD AUTO: 0.4 K/UL (ref 0.3–1)
MONOCYTES NFR BLD: 5.5 % (ref 4–15)
MRSA SCREEN BY PCR: NEGATIVE
NEUTROPHILS # BLD AUTO: 5.6 K/UL (ref 1.8–7.7)
NEUTROPHILS NFR BLD: 79.8 % (ref 38–73)
NRBC BLD-RTO: 0 /100 WBC
PLATELET # BLD AUTO: 348 K/UL (ref 150–450)
PMV BLD AUTO: 11.2 FL (ref 9.2–12.9)
POTASSIUM SERPL-SCNC: 3.7 MMOL/L (ref 3.5–5.1)
PROT SERPL-MCNC: 7.1 G/DL (ref 6–8.4)
PROTHROMBIN TIME: 14.9 SEC (ref 11.4–13.7)
RBC # BLD AUTO: 4.8 M/UL (ref 4–5.4)
SARS-COV-2 RNA RESP QL NAA+PROBE: DETECTED
SARS-COV-2- CYCLE NUMBER: 31
SODIUM SERPL-SCNC: 136 MMOL/L (ref 136–145)
WBC # BLD AUTO: 7.05 K/UL (ref 3.9–12.7)

## 2022-01-26 PROCEDURE — U0005 INFEC AGEN DETEC AMPLI PROBE: HCPCS | Performed by: INTERNAL MEDICINE

## 2022-01-26 PROCEDURE — 87641 MR-STAPH DNA AMP PROBE: CPT | Performed by: INTERNAL MEDICINE

## 2022-01-26 PROCEDURE — 83735 ASSAY OF MAGNESIUM: CPT | Performed by: INTERNAL MEDICINE

## 2022-01-26 PROCEDURE — 80053 COMPREHEN METABOLIC PANEL: CPT | Performed by: INTERNAL MEDICINE

## 2022-01-26 PROCEDURE — U0003 INFECTIOUS AGENT DETECTION BY NUCLEIC ACID (DNA OR RNA); SEVERE ACUTE RESPIRATORY SYNDROME CORONAVIRUS 2 (SARS-COV-2) (CORONAVIRUS DISEASE [COVID-19]), AMPLIFIED PROBE TECHNIQUE, MAKING USE OF HIGH THROUGHPUT TECHNOLOGIES AS DESCRIBED BY CMS-2020-01-R: HCPCS | Performed by: INTERNAL MEDICINE

## 2022-01-26 PROCEDURE — 36415 COLL VENOUS BLD VENIPUNCTURE: CPT | Performed by: INTERNAL MEDICINE

## 2022-01-26 PROCEDURE — 85730 THROMBOPLASTIN TIME PARTIAL: CPT | Performed by: INTERNAL MEDICINE

## 2022-01-26 PROCEDURE — 93010 EKG 12-LEAD: ICD-10-PCS | Mod: ,,, | Performed by: SPECIALIST

## 2022-01-26 PROCEDURE — 85610 PROTHROMBIN TIME: CPT | Performed by: INTERNAL MEDICINE

## 2022-01-26 PROCEDURE — 93005 ELECTROCARDIOGRAM TRACING: CPT | Performed by: SPECIALIST

## 2022-01-26 PROCEDURE — 93010 ELECTROCARDIOGRAM REPORT: CPT | Mod: ,,, | Performed by: SPECIALIST

## 2022-01-26 PROCEDURE — 85025 COMPLETE CBC W/AUTO DIFF WBC: CPT | Performed by: INTERNAL MEDICINE

## 2022-01-26 NOTE — DISCHARGE INSTRUCTIONS
 Nothing to eat or drink after midnight the night before your procedure.   Do not take any medications the morning of your procedure   Bring all your medications with you in the original pill bottles from pharmacy.   If you take blood thinners, ask your doctor when you should stop taking them.   Do your Hibiclens wash the night before and morning of your procedure.   If you use a CPAP or BiPAP at home, please bring it with you the day of your procedure.   Make arrangements for someone you know to drive you home after your procedure. Taxi and Uber are not acceptable.

## 2022-01-27 ENCOUNTER — ANESTHESIA EVENT (OUTPATIENT)
Dept: CARDIOLOGY | Facility: HOSPITAL | Age: 67
End: 2022-01-27
Payer: MEDICARE

## 2022-01-27 DIAGNOSIS — U07.1 COVID-19 VIRUS DETECTED: ICD-10-CM

## 2022-01-28 ENCOUNTER — ANESTHESIA (OUTPATIENT)
Dept: CARDIOLOGY | Facility: HOSPITAL | Age: 67
End: 2022-01-28
Payer: MEDICARE

## 2022-02-09 ENCOUNTER — HOSPITAL ENCOUNTER (OUTPATIENT)
Dept: PREADMISSION TESTING | Facility: HOSPITAL | Age: 67
Discharge: HOME | End: 2022-02-09
Attending: INTERNAL MEDICINE
Payer: MEDICARE

## 2022-02-09 VITALS — BODY MASS INDEX: 31.22 KG/M2 | WEIGHT: 159 LBS | HEIGHT: 60 IN

## 2022-02-09 DIAGNOSIS — Z01.818 PRE-OP TESTING: Primary | ICD-10-CM

## 2022-02-09 LAB
ALBUMIN SERPL BCP-MCNC: 3.3 G/DL (ref 3.5–5.2)
ALP SERPL-CCNC: 99 U/L (ref 55–135)
ALT SERPL W/O P-5'-P-CCNC: 36 U/L (ref 10–44)
ANION GAP SERPL CALC-SCNC: 14 MMOL/L (ref 8–16)
APTT PPP: 32.1 SEC (ref 23.3–35.1)
AST SERPL-CCNC: 26 U/L (ref 10–40)
BASOPHILS # BLD AUTO: 0.04 K/UL (ref 0–0.2)
BASOPHILS NFR BLD: 0.7 % (ref 0–1.9)
BILIRUB SERPL-MCNC: 2.5 MG/DL (ref 0.1–1)
BUN SERPL-MCNC: 28 MG/DL (ref 8–23)
CALCIUM SERPL-MCNC: 9.3 MG/DL (ref 8.7–10.5)
CHLORIDE SERPL-SCNC: 102 MMOL/L (ref 95–110)
CO2 SERPL-SCNC: 23 MMOL/L (ref 23–29)
CREAT SERPL-MCNC: 1.5 MG/DL (ref 0.5–1.4)
DIFFERENTIAL METHOD: ABNORMAL
EOSINOPHIL # BLD AUTO: 0 K/UL (ref 0–0.5)
EOSINOPHIL NFR BLD: 0.3 % (ref 0–8)
ERYTHROCYTE [DISTWIDTH] IN BLOOD BY AUTOMATED COUNT: 16.2 % (ref 11.5–14.5)
EST. GFR  (AFRICAN AMERICAN): 41.3 ML/MIN/1.73 M^2
EST. GFR  (NON AFRICAN AMERICAN): 35.8 ML/MIN/1.73 M^2
GLUCOSE SERPL-MCNC: 106 MG/DL (ref 70–110)
HCT VFR BLD AUTO: 39.2 % (ref 37–48.5)
HGB BLD-MCNC: 12.2 G/DL (ref 12–16)
IMM GRANULOCYTES # BLD AUTO: 0.02 K/UL (ref 0–0.04)
IMM GRANULOCYTES NFR BLD AUTO: 0.3 % (ref 0–0.5)
INR PPP: 1.4
LYMPHOCYTES # BLD AUTO: 0.9 K/UL (ref 1–4.8)
LYMPHOCYTES NFR BLD: 13.9 % (ref 18–48)
MAGNESIUM SERPL-MCNC: 1.9 MG/DL (ref 1.6–2.6)
MCH RBC QN AUTO: 24.9 PG (ref 27–31)
MCHC RBC AUTO-ENTMCNC: 31.1 G/DL (ref 32–36)
MCV RBC AUTO: 80 FL (ref 82–98)
MONOCYTES # BLD AUTO: 0.4 K/UL (ref 0.3–1)
MONOCYTES NFR BLD: 6.4 % (ref 4–15)
MRSA SCREEN BY PCR: NEGATIVE
NEUTROPHILS # BLD AUTO: 4.8 K/UL (ref 1.8–7.7)
NEUTROPHILS NFR BLD: 78.4 % (ref 38–73)
NRBC BLD-RTO: 0 /100 WBC
PLATELET # BLD AUTO: 265 K/UL (ref 150–450)
PMV BLD AUTO: 10.5 FL (ref 9.2–12.9)
POTASSIUM SERPL-SCNC: 3.6 MMOL/L (ref 3.5–5.1)
PROT SERPL-MCNC: 6.9 G/DL (ref 6–8.4)
PROTHROMBIN TIME: 16 SEC (ref 11.4–13.7)
RBC # BLD AUTO: 4.89 M/UL (ref 4–5.4)
SODIUM SERPL-SCNC: 139 MMOL/L (ref 136–145)
WBC # BLD AUTO: 6.11 K/UL (ref 3.9–12.7)

## 2022-02-09 PROCEDURE — 93010 EKG 12-LEAD: ICD-10-PCS | Mod: ,,, | Performed by: SPECIALIST

## 2022-02-09 PROCEDURE — 83735 ASSAY OF MAGNESIUM: CPT | Performed by: INTERNAL MEDICINE

## 2022-02-09 PROCEDURE — 93010 ELECTROCARDIOGRAM REPORT: CPT | Mod: ,,, | Performed by: SPECIALIST

## 2022-02-09 PROCEDURE — 80053 COMPREHEN METABOLIC PANEL: CPT | Performed by: INTERNAL MEDICINE

## 2022-02-09 PROCEDURE — 87641 MR-STAPH DNA AMP PROBE: CPT | Performed by: INTERNAL MEDICINE

## 2022-02-09 PROCEDURE — 85730 THROMBOPLASTIN TIME PARTIAL: CPT | Performed by: INTERNAL MEDICINE

## 2022-02-09 PROCEDURE — 85610 PROTHROMBIN TIME: CPT | Performed by: INTERNAL MEDICINE

## 2022-02-09 PROCEDURE — 93005 ELECTROCARDIOGRAM TRACING: CPT | Performed by: SPECIALIST

## 2022-02-09 PROCEDURE — 36415 COLL VENOUS BLD VENIPUNCTURE: CPT | Performed by: INTERNAL MEDICINE

## 2022-02-09 PROCEDURE — 85025 COMPLETE CBC W/AUTO DIFF WBC: CPT | Performed by: INTERNAL MEDICINE

## 2022-02-09 NOTE — DISCHARGE INSTRUCTIONS
 Nothing to eat or drink after midnight the night before your procedure.   Do not take any medications the morning of your procedure   Bring all your medications with you in the original pill bottles from pharmacy.   If you take blood thinners, ask your doctor when you should stop taking them.   Do your Hibiclens wash the night before and morning of your procedure   Make arrangements for someone you know to drive you home after your procedure. Taxi and Uber are not acceptable.

## 2022-02-11 ENCOUNTER — HOSPITAL ENCOUNTER (OUTPATIENT)
Facility: HOSPITAL | Age: 67
Discharge: HOME | End: 2022-02-12
Attending: INTERNAL MEDICINE | Admitting: INTERNAL MEDICINE
Payer: MEDICARE

## 2022-02-11 DIAGNOSIS — Z95.810 AICD (AUTOMATIC CARDIOVERTER/DEFIBRILLATOR) PRESENT: ICD-10-CM

## 2022-02-11 DIAGNOSIS — I50.20 HFREF (HEART FAILURE WITH REDUCED EJECTION FRACTION): Primary | ICD-10-CM

## 2022-02-11 DIAGNOSIS — I49.9 ARRHYTHMIA: ICD-10-CM

## 2022-02-11 DIAGNOSIS — Z95.810 PRESENCE OF BIVENTRICULAR AUTOMATIC CARDIOVERTER/DEFIBRILLATOR (AICD): ICD-10-CM

## 2022-02-11 DIAGNOSIS — I42.9 PRIMARY CARDIOMYOPATHY: ICD-10-CM

## 2022-02-11 PROCEDURE — 25000003 PHARM REV CODE 250: Performed by: INTERNAL MEDICINE

## 2022-02-11 PROCEDURE — 37000009 HC ANESTHESIA EA ADD 15 MINS: Performed by: INTERNAL MEDICINE

## 2022-02-11 PROCEDURE — 33249 INSJ/RPLCMT DEFIB W/LEAD(S): CPT | Performed by: INTERNAL MEDICINE

## 2022-02-11 PROCEDURE — 25000003 PHARM REV CODE 250: Performed by: NURSE ANESTHETIST, CERTIFIED REGISTERED

## 2022-02-11 PROCEDURE — C1887 CATHETER, GUIDING: HCPCS | Performed by: INTERNAL MEDICINE

## 2022-02-11 PROCEDURE — 63600175 PHARM REV CODE 636 W HCPCS: Performed by: NURSE ANESTHETIST, CERTIFIED REGISTERED

## 2022-02-11 PROCEDURE — C1779 LEAD, PMKR, TRANSVENOUS VDD: HCPCS | Performed by: INTERNAL MEDICINE

## 2022-02-11 PROCEDURE — 33225 L VENTRIC PACING LEAD ADD-ON: CPT | Performed by: INTERNAL MEDICINE

## 2022-02-11 PROCEDURE — C1769 GUIDE WIRE: HCPCS | Performed by: INTERNAL MEDICINE

## 2022-02-11 PROCEDURE — 37000008 HC ANESTHESIA 1ST 15 MINUTES: Performed by: INTERNAL MEDICINE

## 2022-02-11 PROCEDURE — 27000671 HC TUBING MICROBORE EXT: Performed by: NURSE ANESTHETIST, CERTIFIED REGISTERED

## 2022-02-11 PROCEDURE — C1900 LEAD, CORONARY VENOUS: HCPCS | Performed by: INTERNAL MEDICINE

## 2022-02-11 PROCEDURE — C1882 AICD, OTHER THAN SING/DUAL: HCPCS | Performed by: INTERNAL MEDICINE

## 2022-02-11 PROCEDURE — C1777 LEAD, AICD, ENDO SINGLE COIL: HCPCS | Performed by: INTERNAL MEDICINE

## 2022-02-11 PROCEDURE — 27000673 HC TUBING BLOOD Y: Performed by: NURSE ANESTHETIST, CERTIFIED REGISTERED

## 2022-02-11 PROCEDURE — 63600175 PHARM REV CODE 636 W HCPCS: Performed by: INTERNAL MEDICINE

## 2022-02-11 PROCEDURE — 27202103: Performed by: NURSE ANESTHETIST, CERTIFIED REGISTERED

## 2022-02-11 PROCEDURE — 27201423 OPTIME MED/SURG SUP & DEVICES STERILE SUPPLY: Performed by: INTERNAL MEDICINE

## 2022-02-11 PROCEDURE — 27800903 OPTIME MED/SURG SUP & DEVICES OTHER IMPLANTS: Performed by: INTERNAL MEDICINE

## 2022-02-11 DEVICE — LEAD 6935M55 QUATTRO SECURE S MRI US
Type: IMPLANTABLE DEVICE | Site: HEART | Status: FUNCTIONAL
Brand: SPRINT QUATTRO SECURE S MRI™ SURESCAN™

## 2022-02-11 DEVICE — ENVELOPE CMRM6133 ABSORB LRG MR
Type: IMPLANTABLE DEVICE | Site: CHEST  WALL | Status: FUNCTIONAL
Brand: TYRX™

## 2022-02-11 DEVICE — LEAD 439888 MRI STRAIGHT US
Type: IMPLANTABLE DEVICE | Site: HEART | Status: FUNCTIONAL
Brand: ATTAIN PERFORMA™ STRAIGHT MRI SURESCAN™

## 2022-02-11 DEVICE — CRTD DTMB1QQ AMPLIA MRI QUAD US DF4
Type: IMPLANTABLE DEVICE | Site: HEART | Status: FUNCTIONAL
Brand: AMPLIA MRI™ QUAD CRT-D SURESCAN™

## 2022-02-11 DEVICE — LEAD 407645 CAPSUREFIX NOVUS US MRI
Type: IMPLANTABLE DEVICE | Site: HEART | Status: FUNCTIONAL
Brand: CAPSUREFIX NOVUS MRI™ SURESCAN™

## 2022-02-11 RX ORDER — POTASSIUM CHLORIDE 20 MEQ/1
20 TABLET, EXTENDED RELEASE ORAL ONCE
Status: COMPLETED | OUTPATIENT
Start: 2022-02-11 | End: 2022-02-11

## 2022-02-11 RX ORDER — KETAMINE HYDROCHLORIDE 50 MG/ML
INJECTION, SOLUTION INTRAMUSCULAR; INTRAVENOUS
Status: DISCONTINUED | OUTPATIENT
Start: 2022-02-11 | End: 2022-02-11

## 2022-02-11 RX ORDER — SODIUM CHLORIDE 0.9 % (FLUSH) 0.9 %
10 SYRINGE (ML) INJECTION
Status: DISCONTINUED | OUTPATIENT
Start: 2022-02-11 | End: 2022-02-12 | Stop reason: HOSPADM

## 2022-02-11 RX ORDER — PROPOFOL 10 MG/ML
VIAL (ML) INTRAVENOUS
Status: DISCONTINUED | OUTPATIENT
Start: 2022-02-11 | End: 2022-02-11

## 2022-02-11 RX ORDER — CEFAZOLIN SODIUM 2 G/50ML
2 SOLUTION INTRAVENOUS ONCE
Status: COMPLETED | OUTPATIENT
Start: 2022-02-11 | End: 2022-02-11

## 2022-02-11 RX ORDER — HYDROCODONE BITARTRATE AND ACETAMINOPHEN 5; 325 MG/1; MG/1
1 TABLET ORAL EVERY 4 HOURS PRN
Status: DISCONTINUED | OUTPATIENT
Start: 2022-02-11 | End: 2022-02-12 | Stop reason: HOSPADM

## 2022-02-11 RX ORDER — SODIUM CHLORIDE, SODIUM LACTATE, POTASSIUM CHLORIDE, CALCIUM CHLORIDE 600; 310; 30; 20 MG/100ML; MG/100ML; MG/100ML; MG/100ML
INJECTION, SOLUTION INTRAVENOUS CONTINUOUS PRN
Status: DISCONTINUED | OUTPATIENT
Start: 2022-02-11 | End: 2022-02-11

## 2022-02-11 RX ORDER — ONDANSETRON 2 MG/ML
4 INJECTION INTRAMUSCULAR; INTRAVENOUS DAILY PRN
Status: DISCONTINUED | OUTPATIENT
Start: 2022-02-11 | End: 2022-02-11 | Stop reason: HOSPADM

## 2022-02-11 RX ORDER — SUCCINYLCHOLINE CHLORIDE 20 MG/ML
INJECTION INTRAMUSCULAR; INTRAVENOUS
Status: DISCONTINUED | OUTPATIENT
Start: 2022-02-11 | End: 2022-02-11

## 2022-02-11 RX ORDER — MEPERIDINE HYDROCHLORIDE 50 MG/ML
12.5 INJECTION INTRAMUSCULAR; INTRAVENOUS; SUBCUTANEOUS EVERY 10 MIN PRN
Status: DISCONTINUED | OUTPATIENT
Start: 2022-02-11 | End: 2022-02-11 | Stop reason: HOSPADM

## 2022-02-11 RX ORDER — FUROSEMIDE 20 MG/1
20 TABLET ORAL DAILY
Status: DISCONTINUED | OUTPATIENT
Start: 2022-02-11 | End: 2022-02-12 | Stop reason: HOSPADM

## 2022-02-11 RX ORDER — FENTANYL CITRATE 50 UG/ML
25 INJECTION, SOLUTION INTRAMUSCULAR; INTRAVENOUS EVERY 5 MIN PRN
Status: DISCONTINUED | OUTPATIENT
Start: 2022-02-11 | End: 2022-02-11 | Stop reason: HOSPADM

## 2022-02-11 RX ORDER — LIDOCAINE HYDROCHLORIDE 20 MG/ML
JELLY TOPICAL
Status: DISCONTINUED | OUTPATIENT
Start: 2022-02-11 | End: 2022-02-11

## 2022-02-11 RX ORDER — DIPHENHYDRAMINE HYDROCHLORIDE 50 MG/ML
12.5 INJECTION INTRAMUSCULAR; INTRAVENOUS
Status: DISCONTINUED | OUTPATIENT
Start: 2022-02-11 | End: 2022-02-11 | Stop reason: HOSPADM

## 2022-02-11 RX ORDER — FENTANYL CITRATE 50 UG/ML
INJECTION, SOLUTION INTRAMUSCULAR; INTRAVENOUS
Status: DISCONTINUED | OUTPATIENT
Start: 2022-02-11 | End: 2022-02-11

## 2022-02-11 RX ORDER — LISINOPRIL 20 MG/1
20 TABLET ORAL DAILY
Status: DISCONTINUED | OUTPATIENT
Start: 2022-02-11 | End: 2022-02-12 | Stop reason: HOSPADM

## 2022-02-11 RX ORDER — LIDOCAINE HYDROCHLORIDE 10 MG/ML
INJECTION, SOLUTION EPIDURAL; INFILTRATION; INTRACAUDAL; PERINEURAL
Status: DISCONTINUED | OUTPATIENT
Start: 2022-02-11 | End: 2022-02-11 | Stop reason: HOSPADM

## 2022-02-11 RX ORDER — PHENYLEPHRINE HYDROCHLORIDE 10 MG/ML
INJECTION INTRAVENOUS
Status: DISCONTINUED | OUTPATIENT
Start: 2022-02-11 | End: 2022-02-11

## 2022-02-11 RX ORDER — SODIUM CHLORIDE 450 MG/100ML
INJECTION, SOLUTION INTRAVENOUS CONTINUOUS
Status: DISCONTINUED | OUTPATIENT
Start: 2022-02-11 | End: 2022-02-11

## 2022-02-11 RX ORDER — ONDANSETRON 2 MG/ML
INJECTION INTRAMUSCULAR; INTRAVENOUS
Status: DISCONTINUED | OUTPATIENT
Start: 2022-02-11 | End: 2022-02-11

## 2022-02-11 RX ORDER — ASPIRIN 81 MG/1
81 TABLET ORAL DAILY
Status: DISCONTINUED | OUTPATIENT
Start: 2022-02-11 | End: 2022-02-12 | Stop reason: HOSPADM

## 2022-02-11 RX ORDER — CEFAZOLIN SODIUM 2 G/50ML
2 SOLUTION INTRAVENOUS
Status: COMPLETED | OUTPATIENT
Start: 2022-02-11 | End: 2022-02-12

## 2022-02-11 RX ADMIN — LISINOPRIL 20 MG: 20 TABLET ORAL at 04:02

## 2022-02-11 RX ADMIN — ASPIRIN 81 MG: 81 TABLET, DELAYED RELEASE ORAL at 04:02

## 2022-02-11 RX ADMIN — SODIUM CHLORIDE: 0.45 INJECTION, SOLUTION INTRAVENOUS at 06:02

## 2022-02-11 RX ADMIN — PHENYLEPHRINE HYDROCHLORIDE 100 MCG: 10 INJECTION INTRAVENOUS at 08:02

## 2022-02-11 RX ADMIN — FENTANYL CITRATE 50 MCG: 50 INJECTION INTRAMUSCULAR; INTRAVENOUS at 08:02

## 2022-02-11 RX ADMIN — FUROSEMIDE 20 MG: 20 TABLET ORAL at 04:02

## 2022-02-11 RX ADMIN — HYDROCODONE BITARTRATE AND ACETAMINOPHEN 1 TABLET: 5; 325 TABLET ORAL at 04:02

## 2022-02-11 RX ADMIN — SODIUM CHLORIDE, SODIUM LACTATE, POTASSIUM CHLORIDE, AND CALCIUM CHLORIDE: .6; .31; .03; .02 INJECTION, SOLUTION INTRAVENOUS at 07:02

## 2022-02-11 RX ADMIN — DEXTROSE 2 G: 50 INJECTION, SOLUTION INTRAVENOUS at 04:02

## 2022-02-11 RX ADMIN — PROPOFOL 50 MG: 10 INJECTION, EMULSION INTRAVENOUS at 07:02

## 2022-02-11 RX ADMIN — LIDOCAINE HYDROCHLORIDE 4 ML: 20 JELLY TOPICAL at 07:02

## 2022-02-11 RX ADMIN — PHENYLEPHRINE HYDROCHLORIDE 2.5 MCG/KG/MIN: 10 INJECTION INTRAVENOUS at 09:02

## 2022-02-11 RX ADMIN — KETAMINE HYDROCHLORIDE 50 MG: 50 INJECTION INTRAMUSCULAR; INTRAVENOUS at 07:02

## 2022-02-11 RX ADMIN — KETAMINE HYDROCHLORIDE 50 MG: 50 INJECTION INTRAMUSCULAR; INTRAVENOUS at 08:02

## 2022-02-11 RX ADMIN — Medication 100 MG: at 07:02

## 2022-02-11 RX ADMIN — DEXTROSE 2 G: 50 INJECTION, SOLUTION INTRAVENOUS at 06:02

## 2022-02-11 RX ADMIN — ONDANSETRON 4 MG: 2 INJECTION INTRAMUSCULAR; INTRAVENOUS at 08:02

## 2022-02-11 RX ADMIN — PROPOFOL 20 MG: 10 INJECTION, EMULSION INTRAVENOUS at 07:02

## 2022-02-11 RX ADMIN — POTASSIUM CHLORIDE 20 MEQ: 20 TABLET, EXTENDED RELEASE ORAL at 06:02

## 2022-02-11 RX ADMIN — HYDROCODONE BITARTRATE AND ACETAMINOPHEN 1 TABLET: 5; 325 TABLET ORAL at 11:02

## 2022-02-11 RX ADMIN — METOPROLOL SUCCINATE 12.5 MG: 25 TABLET, EXTENDED RELEASE ORAL at 04:02

## 2022-02-11 NOTE — ANESTHESIA PREPROCEDURE EVALUATION
02/11/2022  Steff Jaquez is a 66 y.o., female.      Patient Active Problem List   Diagnosis    HTN (hypertension)    Pleural effusion    Cardiomegaly    HFrEF (heart failure with reduced ejection fraction)    Volume overload    Diarrhea       Past Surgical History:   Procedure Laterality Date    CHOLECYSTECTOMY      LEFT HEART CATHETERIZATION Left 11/16/2021    Procedure: Left heart cath;  Surgeon: Mingo Pedraza MD;  Location: [facility] CATH LAB;  Service: Cardiology;  Laterality: Left;        Tobacco Use:  The patient  reports that she has never smoked. She has never used smokeless tobacco.     Results for orders placed or performed during the hospital encounter of 02/09/22   EKG 12-lead    Collection Time: 02/09/22 10:39 AM    Narrative    Test Reason : Z01.818,    Vent. Rate : 098 BPM     Atrial Rate : 098 BPM     P-R Int : 152 ms          QRS Dur : 134 ms      QT Int : 410 ms       P-R-T Axes : 000 -16 151 degrees     QTc Int : 523 ms    Normal sinus rhythm  Nonspecific intraventricular block  Cannot rule out Anterior infarct ,age undetermined  T wave abnormality, consider lateral ischemia  Abnormal ECG  When compared with ECG of 26-JAN-2022 11:41,  The axis Shifted right  T wave inversion more evident in Lateral leads    Referred By:             Confirmed By:              Lab Results   Component Value Date    WBC 6.11 02/09/2022    HGB 12.2 02/09/2022    HCT 39.2 02/09/2022    MCV 80 (L) 02/09/2022     02/09/2022     BMP  Lab Results   Component Value Date     02/09/2022    K 3.6 02/09/2022     02/09/2022    CO2 23 02/09/2022    BUN 28 (H) 02/09/2022    CREATININE 1.5 (H) 02/09/2022    CALCIUM 9.3 02/09/2022    ANIONGAP 14 02/09/2022     02/09/2022     01/26/2022     01/11/2022       Results for orders placed during the hospital encounter of  01/10/22    Echo    Interpretation Summary  · The estimated ejection fraction is 10%.  · The left ventricle is moderately enlarged with severely decreased systolic function.  · Grade III left ventricular diastolic dysfunction.  · Normal right ventricular size with normal right ventricular systolic function.  · Mild pulmonic regurgitation.  · Severe left atrial enlargement.  · Severe right atrial enlargement.  · Moderate-to-severe mitral regurgitation.  · Moderate tricuspid regurgitation.  · Elevated central venous pressure (15 mmHg).  · The estimated PA systolic pressure is 67 mmHg.  · There is pulmonary hypertension.            Anesthesia Evaluation    I have reviewed the Patient Summary Reports.    I have reviewed the Nursing Notes. I have reviewed the NPO Status.   I have reviewed the Medications.     Review of Systems  Anesthesia Hx:  No problems with previous Anesthesia  Denies Family Hx of Anesthesia complications.   Denies Personal Hx of Anesthesia complications.   Social:  Non-Smoker    Hematology/Oncology:  Hematology Normal        Cardiovascular:   Hypertension, well controlled CHF (EF est 10%)    Pulmonary:  Pulmonary Normal    Hepatic/GI:  Hepatic/GI Normal    Musculoskeletal:  Musculoskeletal Normal    Neurological:  Neurology Normal    Endocrine:  Endocrine Normal        Physical Exam  General:  Obesity    Airway/Jaw/Neck:  Airway Findings: Mouth Opening: Normal Tongue: Normal  General Airway Assessment: Adult  Mallampati: III  TM Distance: Normal, at least 6 cm  Jaw/Neck Findings:  Neck ROM: Normal ROM       Chest/Lungs:  Chest/Lungs Findings: Clear to auscultation, Normal Respiratory Rate     Heart/Vascular:  Heart Findings: Rate: Normal  Rhythm: Regular Rhythm  Sounds: Normal     Abdomen:  Abdomen Findings: Normal    Musculoskeletal:  Musculoskeletal Findings: Normal   Skin:  Skin Findings: Normal    Mental Status:  Mental Status Findings:  Cooperative, Alert and Oriented         Anesthesia  Plan  Type of Anesthesia, risks & benefits discussed:  Anesthesia Type:  general    Patient's Preference:   Plan Factors:          Intra-op Monitoring Plan: standard ASA monitors  Intra-op Monitoring Plan Comments:   Post Op Pain Control Plan: multimodal analgesia  Post Op Pain Control Plan Comments:     Induction:   IV  Beta Blocker:  Patient is not currently on a Beta-Blocker (No further documentation required).       Informed Consent: Patient understands risks and agrees with Anesthesia plan.  Questions answered. Anesthesia consent signed with patient.  ASA Score: 4     Day of Surgery Review of History & Physical: I have interviewed and examined the patient. I have reviewed the patient's H&P dated:  There are no significant changes.      Anesthesia Plan Notes: GETA  Ketamine available   Cautious IV fluid management  Zofran/Decadron        Ready For Surgery From Anesthesia Perspective.

## 2022-02-11 NOTE — TRANSFER OF CARE
Anesthesia Transfer of Care Note    Patient: Steff Jaquez    Procedure(s) Performed: Procedure(s) (LRB):  INSERTION, AICD, BIVENTRICULAR (MEDRONIC) (N/A)    Patient location: PACU    Anesthesia Type: general    Transport from OR: Transported from OR on 2-3 L/min O2 by NC with adequate spontaneous ventilation    Post pain: adequate analgesia    Post assessment: no apparent anesthetic complications    Post vital signs: stable    Level of consciousness: awake and alert    Nausea/Vomiting: no nausea/vomiting    Complications: none    Transfer of care protocol was followed      Last vitals:   Visit Vitals  /86   Pulse 91   Temp 36.6 °C (97.9 °F)   Resp (!) 36   SpO2 99%

## 2022-02-11 NOTE — NURSING TRANSFER
Nursing Transfer Note      2/11/2022     Reason patient is being transferred:Admission  Transfer {TRANSFER TO 2510 /FROM:1402     Transfer via Stretcher    Transfer with Monitor  Transferred by Jackie Ty RN

## 2022-02-11 NOTE — Clinical Note
The lead was inserted under fluorscopic guidance. The lead was inserted in the left chest wall. A new lead was attached to the right atrium.

## 2022-02-11 NOTE — NURSING
Received patient to room 2510 from [facility] [place]. Dressing to left chest wall dry/intact. VSS no complaints at this time. Telemetry placed on patient. Daughter at bedside.

## 2022-02-11 NOTE — Clinical Note
The lead was inserted under fluorscopic guidance. The lead was inserted in the left chest wall. A new lead was attached to the right ventricle.

## 2022-02-11 NOTE — Clinical Note
A venogram was performed in the coronary sinus. The vessel was injected via hand injection  with 10 mL of contrast.

## 2022-02-11 NOTE — ANESTHESIA POSTPROCEDURE EVALUATION
Anesthesia Post Evaluation    Patient: Steff Jaquez    Procedure(s) Performed: Procedure(s) (LRB):  INSERTION, AICD, BIVENTRICULAR (MEDRONIC) (N/A)    Final Anesthesia Type: general      Patient location during evaluation: PACU  Patient participation: Yes- Able to Participate  Level of consciousness: awake and alert  Post-procedure vital signs: reviewed and stable  Pain management: adequate  Airway patency: patent    PONV status at discharge: No PONV  Anesthetic complications: no      Cardiovascular status: blood pressure returned to baseline and stable  Respiratory status: unassisted and room air  Hydration status: euvolemic  Follow-up not needed.          Vitals Value Taken Time   /67 02/11/22 1203   Temp 36.3 °C (97.3 °F) 02/11/22 1200   Pulse 82 02/11/22 1209   Resp 19 02/11/22 1209   SpO2 94 % 02/11/22 1209   Vitals shown include unvalidated device data.      No case tracking events are documented in the log.      Pain/Isis Score: Isis Score: 10 (2/11/2022 12:00 PM)

## 2022-02-11 NOTE — Clinical Note
The lead was inserted under fluorscopic guidance. The lead was inserted in the left chest wall. A new lead was attached to the left ventricle and coronary sinus.

## 2022-02-11 NOTE — Clinical Note
The site was marked. The left chest was prepped. The site was prepped with ChloraPrep. The site was clipped. The patient was draped. The patient was positioned supine. The patient was secured using safety straps.

## 2022-02-11 NOTE — Clinical Note
The lead was inserted under fluorscopic guidance. The lead was inserted in the left chest wall. A new lead was attached to the left ventricle.

## 2022-02-11 NOTE — Clinical Note
10 ml of contrast were injected throughout the case. 40 mL of contrast was the total wasted during the case. 50 mL was the total amount used during the case.

## 2022-02-12 VITALS
HEART RATE: 84 BPM | SYSTOLIC BLOOD PRESSURE: 111 MMHG | RESPIRATION RATE: 18 BRPM | BODY MASS INDEX: 33.07 KG/M2 | DIASTOLIC BLOOD PRESSURE: 84 MMHG | HEIGHT: 60 IN | OXYGEN SATURATION: 98 % | WEIGHT: 168.44 LBS | TEMPERATURE: 98 F

## 2022-02-12 PROBLEM — Z95.810 PRESENCE OF BIVENTRICULAR AUTOMATIC CARDIOVERTER/DEFIBRILLATOR (AICD): Status: ACTIVE | Noted: 2022-02-12

## 2022-02-12 LAB
ANION GAP SERPL CALC-SCNC: 11 MMOL/L (ref 8–16)
BUN SERPL-MCNC: 27 MG/DL (ref 8–23)
CALCIUM SERPL-MCNC: 8.4 MG/DL (ref 8.7–10.5)
CHLORIDE SERPL-SCNC: 100 MMOL/L (ref 95–110)
CO2 SERPL-SCNC: 24 MMOL/L (ref 23–29)
CREAT SERPL-MCNC: 1.7 MG/DL (ref 0.5–1.4)
EST. GFR  (AFRICAN AMERICAN): 35.5 ML/MIN/1.73 M^2
EST. GFR  (NON AFRICAN AMERICAN): 30.8 ML/MIN/1.73 M^2
GLUCOSE SERPL-MCNC: 114 MG/DL (ref 70–110)
MAGNESIUM SERPL-MCNC: 1.8 MG/DL (ref 1.6–2.6)
POTASSIUM SERPL-SCNC: 3.7 MMOL/L (ref 3.5–5.1)
SODIUM SERPL-SCNC: 135 MMOL/L (ref 136–145)

## 2022-02-12 PROCEDURE — 80048 BASIC METABOLIC PNL TOTAL CA: CPT | Performed by: INTERNAL MEDICINE

## 2022-02-12 PROCEDURE — 93010 EKG 12-LEAD: ICD-10-PCS | Mod: ,,, | Performed by: SPECIALIST

## 2022-02-12 PROCEDURE — 93010 ELECTROCARDIOGRAM REPORT: CPT | Mod: ,,, | Performed by: SPECIALIST

## 2022-02-12 PROCEDURE — 93005 ELECTROCARDIOGRAM TRACING: CPT | Performed by: SPECIALIST

## 2022-02-12 PROCEDURE — 25000003 PHARM REV CODE 250: Performed by: PHYSICIAN ASSISTANT

## 2022-02-12 PROCEDURE — 25000003 PHARM REV CODE 250: Performed by: INTERNAL MEDICINE

## 2022-02-12 PROCEDURE — 36415 COLL VENOUS BLD VENIPUNCTURE: CPT | Performed by: INTERNAL MEDICINE

## 2022-02-12 PROCEDURE — 83735 ASSAY OF MAGNESIUM: CPT | Performed by: INTERNAL MEDICINE

## 2022-02-12 PROCEDURE — 63600175 PHARM REV CODE 636 W HCPCS: Performed by: INTERNAL MEDICINE

## 2022-02-12 RX ORDER — CEPHALEXIN 500 MG/1
500 CAPSULE ORAL EVERY 8 HOURS
Qty: 15 CAPSULE | Refills: 0
Start: 2022-02-12 | End: 2022-02-17

## 2022-02-12 RX ORDER — POTASSIUM CHLORIDE 20 MEQ/1
40 TABLET, EXTENDED RELEASE ORAL ONCE
Status: COMPLETED | OUTPATIENT
Start: 2022-02-12 | End: 2022-02-12

## 2022-02-12 RX ORDER — LANOLIN ALCOHOL/MO/W.PET/CERES
400 CREAM (GRAM) TOPICAL ONCE
Status: COMPLETED | OUTPATIENT
Start: 2022-02-12 | End: 2022-02-12

## 2022-02-12 RX ORDER — METOPROLOL SUCCINATE 25 MG/1
25 TABLET, EXTENDED RELEASE ORAL DAILY
Status: DISCONTINUED | OUTPATIENT
Start: 2022-02-13 | End: 2022-02-12 | Stop reason: HOSPADM

## 2022-02-12 RX ORDER — HYDROCODONE BITARTRATE AND ACETAMINOPHEN 5; 325 MG/1; MG/1
1 TABLET ORAL EVERY 12 HOURS PRN
Qty: 16 TABLET | Refills: 0
Start: 2022-02-12 | End: 2022-02-20

## 2022-02-12 RX ADMIN — MAGNESIUM OXIDE 400 MG: 400 TABLET ORAL at 02:02

## 2022-02-12 RX ADMIN — LISINOPRIL 20 MG: 20 TABLET ORAL at 09:02

## 2022-02-12 RX ADMIN — POTASSIUM CHLORIDE 40 MEQ: 20 TABLET, EXTENDED RELEASE ORAL at 02:02

## 2022-02-12 RX ADMIN — DEXTROSE 2 G: 50 INJECTION, SOLUTION INTRAVENOUS at 12:02

## 2022-02-12 RX ADMIN — METOPROLOL SUCCINATE 12.5 MG: 25 TABLET, EXTENDED RELEASE ORAL at 09:02

## 2022-02-12 RX ADMIN — ASPIRIN 81 MG: 81 TABLET, DELAYED RELEASE ORAL at 09:02

## 2022-02-12 RX ADMIN — FUROSEMIDE 20 MG: 20 TABLET ORAL at 09:02

## 2022-02-12 NOTE — NURSING
Discharge instructions reviewed with pt and daughter. Questions answered. Copy of discharge instructions and Rx's (keflex, norco) given to pt. Discharge home via w/c to vehicle. Accompanied by daughter.

## 2022-02-12 NOTE — PLAN OF CARE
Problem: Adult Inpatient Plan of Care  Goal: Plan of Care Review  2/12/2022 1512 by Jayne Stiles RN  Outcome: Met  2/12/2022 1300 by Jayne Stiles RN  Outcome: Ongoing, Progressing  Goal: Patient-Specific Goal (Individualized)  2/12/2022 1512 by Jayne Stiles RN  Outcome: Met  2/12/2022 1300 by Jayen Stiles RN  Outcome: Ongoing, Progressing  Goal: Absence of Hospital-Acquired Illness or Injury  2/12/2022 1512 by Jayne Stiles RN  Outcome: Met  2/12/2022 1300 by Jayne Stiles RN  Outcome: Ongoing, Progressing  Goal: Optimal Comfort and Wellbeing  2/12/2022 1512 by Jayne Stiles RN  Outcome: Met  2/12/2022 1300 by Jayne Stiles RN  Outcome: Ongoing, Progressing  Goal: Readiness for Transition of Care  2/12/2022 1512 by Jayne Stiles RN  Outcome: Met  2/12/2022 1300 by Jayne Stiles RN  Outcome: Ongoing, Progressing

## 2022-02-12 NOTE — PLAN OF CARE
Problem: Adult Inpatient Plan of Care  Goal: Patient-Specific Goal (Individualized)  Outcome: Ongoing, Progressing  Goal: Optimal Comfort and Wellbeing  Outcome: Ongoing, Progressing  Goal: Readiness for Transition of Care  Outcome: Ongoing, Progressing

## 2022-02-12 NOTE — DISCHARGE SUMMARY
[facility]  Discharge Note  Short Stay    Procedure(s) (LRB):  INSERTION, AICD, BIVENTRICULAR (MEDRONIC) (N/A)    OUTCOME: Patient tolerated treatment/procedure well without complication and is now ready for discharge.    DISPOSITION: Home or Self Care    FINAL DIAGNOSIS:  Primary cardiomyopathy    FOLLOWUP: In clinic 1 wk with Dr Alarcon for wound check    DISCHARGE INSTRUCTIONS:    Discharge Procedure Orders   Diet Cardiac     Other restrictions (specify):   Order Comments: No overhead reaching with affected arm for 1 month  No extreme reaching with affected arm for 1 month  Wear sling for 1st week     Leave dressing on - Keep it clean, dry, and intact until clinic visit        TIME SPENT ON DISCHARGE: 30 minutes

## 2022-02-12 NOTE — NURSING
Pete CHRISTIANSON notified of 9 bt run v tach @ 1052 and 6 bt run v tach @ 1237. AICD interrogated.

## 2022-02-14 NOTE — PLAN OF CARE
02/14/22 1059   Final Note   Assessment Type Final Discharge Note   Anticipated Discharge Disposition Home   What phone number can be called within the next 1-3 days to see how you are doing after discharge? [phone]     Discharge orders reviewed. No case management/discharge planning needs noted.
